# Patient Record
Sex: FEMALE | Race: BLACK OR AFRICAN AMERICAN | Employment: UNEMPLOYED | URBAN - NONMETROPOLITAN AREA
[De-identification: names, ages, dates, MRNs, and addresses within clinical notes are randomized per-mention and may not be internally consistent; named-entity substitution may affect disease eponyms.]

---
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
## 2024-01-05 ENCOUNTER — APPOINTMENT (OUTPATIENT)
Dept: RADIOLOGY | Facility: HOSPITAL | Age: 19
End: 2024-01-05
Payer: COMMERCIAL

## 2024-01-05 ENCOUNTER — HOSPITAL ENCOUNTER (EMERGENCY)
Facility: HOSPITAL | Age: 19
Discharge: HOME | End: 2024-01-06
Attending: STUDENT IN AN ORGANIZED HEALTH CARE EDUCATION/TRAINING PROGRAM
Payer: COMMERCIAL

## 2024-01-05 DIAGNOSIS — D72.829 LEUKOCYTOSIS, UNSPECIFIED TYPE: ICD-10-CM

## 2024-01-05 DIAGNOSIS — R10.9 ABDOMINAL PAIN, UNSPECIFIED ABDOMINAL LOCATION: Primary | ICD-10-CM

## 2024-01-05 LAB
ALBUMIN SERPL BCP-MCNC: 4.3 G/DL (ref 3.4–5)
ALP SERPL-CCNC: 63 U/L (ref 33–110)
ALT SERPL W P-5'-P-CCNC: 11 U/L (ref 7–45)
ANION GAP SERPL CALC-SCNC: 13 MMOL/L (ref 10–20)
APPEARANCE UR: ABNORMAL
AST SERPL W P-5'-P-CCNC: 19 U/L (ref 9–39)
B-HCG SERPL-ACNC: <2 MIU/ML
BASOPHILS # BLD AUTO: 0.02 X10*3/UL (ref 0–0.1)
BASOPHILS NFR BLD AUTO: 0.2 %
BILIRUB SERPL-MCNC: 0.6 MG/DL (ref 0–1.2)
BILIRUB UR STRIP.AUTO-MCNC: NEGATIVE MG/DL
BUN SERPL-MCNC: 12 MG/DL (ref 6–23)
CALCIUM SERPL-MCNC: 9.6 MG/DL (ref 8.6–10.3)
CHLORIDE SERPL-SCNC: 105 MMOL/L (ref 98–107)
CO2 SERPL-SCNC: 23 MMOL/L (ref 21–32)
COLOR UR: YELLOW
CREAT SERPL-MCNC: 0.61 MG/DL (ref 0.5–1.05)
EOSINOPHIL # BLD AUTO: 0.07 X10*3/UL (ref 0–0.7)
EOSINOPHIL NFR BLD AUTO: 0.6 %
ERYTHROCYTE [DISTWIDTH] IN BLOOD BY AUTOMATED COUNT: 15.5 % (ref 11.5–14.5)
FLUAV RNA RESP QL NAA+PROBE: NOT DETECTED
FLUBV RNA RESP QL NAA+PROBE: NOT DETECTED
GFR SERPL CREATININE-BSD FRML MDRD: >90 ML/MIN/1.73M*2
GLUCOSE SERPL-MCNC: 77 MG/DL (ref 74–99)
GLUCOSE UR STRIP.AUTO-MCNC: NEGATIVE MG/DL
HCT VFR BLD AUTO: 39.8 % (ref 36–46)
HGB BLD-MCNC: 12.7 G/DL (ref 12–16)
IMM GRANULOCYTES # BLD AUTO: 0.04 X10*3/UL (ref 0–0.7)
IMM GRANULOCYTES NFR BLD AUTO: 0.4 % (ref 0–0.9)
KETONES UR STRIP.AUTO-MCNC: ABNORMAL MG/DL
LEUKOCYTE ESTERASE UR QL STRIP.AUTO: NEGATIVE
LIPASE SERPL-CCNC: 12 U/L (ref 9–82)
LYMPHOCYTES # BLD AUTO: 2.8 X10*3/UL (ref 1.2–4.8)
LYMPHOCYTES NFR BLD AUTO: 24.7 %
MAGNESIUM SERPL-MCNC: 2.02 MG/DL (ref 1.6–2.4)
MCH RBC QN AUTO: 28.9 PG (ref 26–34)
MCHC RBC AUTO-ENTMCNC: 31.9 G/DL (ref 32–36)
MCV RBC AUTO: 91 FL (ref 80–100)
MONOCYTES # BLD AUTO: 0.76 X10*3/UL (ref 0.1–1)
MONOCYTES NFR BLD AUTO: 6.7 %
NEUTROPHILS # BLD AUTO: 7.66 X10*3/UL (ref 1.2–7.7)
NEUTROPHILS NFR BLD AUTO: 67.4 %
NITRITE UR QL STRIP.AUTO: NEGATIVE
NRBC BLD-RTO: 0 /100 WBCS (ref 0–0)
PH UR STRIP.AUTO: 6 [PH]
PLATELET # BLD AUTO: 255 X10*3/UL (ref 150–450)
POTASSIUM SERPL-SCNC: 5.1 MMOL/L (ref 3.5–5.3)
PROT SERPL-MCNC: 8 G/DL (ref 6.4–8.2)
PROT UR STRIP.AUTO-MCNC: NEGATIVE MG/DL
RBC # BLD AUTO: 4.4 X10*6/UL (ref 4–5.2)
RBC # UR STRIP.AUTO: NEGATIVE /UL
SARS-COV-2 RNA RESP QL NAA+PROBE: NOT DETECTED
SODIUM SERPL-SCNC: 136 MMOL/L (ref 136–145)
SP GR UR STRIP.AUTO: 1.06
UROBILINOGEN UR STRIP.AUTO-MCNC: <2 MG/DL
WBC # BLD AUTO: 11.4 X10*3/UL (ref 4.4–11.3)

## 2024-01-05 PROCEDURE — 83735 ASSAY OF MAGNESIUM: CPT | Performed by: STUDENT IN AN ORGANIZED HEALTH CARE EDUCATION/TRAINING PROGRAM

## 2024-01-05 PROCEDURE — 74177 CT ABD & PELVIS W/CONTRAST: CPT

## 2024-01-05 PROCEDURE — 96375 TX/PRO/DX INJ NEW DRUG ADDON: CPT

## 2024-01-05 PROCEDURE — 81003 URINALYSIS AUTO W/O SCOPE: CPT | Performed by: STUDENT IN AN ORGANIZED HEALTH CARE EDUCATION/TRAINING PROGRAM

## 2024-01-05 PROCEDURE — 74177 CT ABD & PELVIS W/CONTRAST: CPT | Performed by: RADIOLOGY

## 2024-01-05 PROCEDURE — 84702 CHORIONIC GONADOTROPIN TEST: CPT | Performed by: STUDENT IN AN ORGANIZED HEALTH CARE EDUCATION/TRAINING PROGRAM

## 2024-01-05 PROCEDURE — 96374 THER/PROPH/DIAG INJ IV PUSH: CPT

## 2024-01-05 PROCEDURE — 85025 COMPLETE CBC W/AUTO DIFF WBC: CPT | Performed by: STUDENT IN AN ORGANIZED HEALTH CARE EDUCATION/TRAINING PROGRAM

## 2024-01-05 PROCEDURE — 36415 COLL VENOUS BLD VENIPUNCTURE: CPT | Performed by: STUDENT IN AN ORGANIZED HEALTH CARE EDUCATION/TRAINING PROGRAM

## 2024-01-05 PROCEDURE — 2550000001 HC RX 255 CONTRASTS: Performed by: STUDENT IN AN ORGANIZED HEALTH CARE EDUCATION/TRAINING PROGRAM

## 2024-01-05 PROCEDURE — 84075 ASSAY ALKALINE PHOSPHATASE: CPT | Performed by: STUDENT IN AN ORGANIZED HEALTH CARE EDUCATION/TRAINING PROGRAM

## 2024-01-05 PROCEDURE — 2500000004 HC RX 250 GENERAL PHARMACY W/ HCPCS (ALT 636 FOR OP/ED): Performed by: STUDENT IN AN ORGANIZED HEALTH CARE EDUCATION/TRAINING PROGRAM

## 2024-01-05 PROCEDURE — 83690 ASSAY OF LIPASE: CPT | Performed by: STUDENT IN AN ORGANIZED HEALTH CARE EDUCATION/TRAINING PROGRAM

## 2024-01-05 PROCEDURE — 99285 EMERGENCY DEPT VISIT HI MDM: CPT | Performed by: STUDENT IN AN ORGANIZED HEALTH CARE EDUCATION/TRAINING PROGRAM

## 2024-01-05 PROCEDURE — 87636 SARSCOV2 & INF A&B AMP PRB: CPT | Performed by: STUDENT IN AN ORGANIZED HEALTH CARE EDUCATION/TRAINING PROGRAM

## 2024-01-05 PROCEDURE — 99284 EMERGENCY DEPT VISIT MOD MDM: CPT | Performed by: STUDENT IN AN ORGANIZED HEALTH CARE EDUCATION/TRAINING PROGRAM

## 2024-01-05 RX ORDER — ONDANSETRON HYDROCHLORIDE 2 MG/ML
4 INJECTION, SOLUTION INTRAVENOUS ONCE
Status: COMPLETED | OUTPATIENT
Start: 2024-01-05 | End: 2024-01-05

## 2024-01-05 RX ORDER — MORPHINE SULFATE 2 MG/ML
2 INJECTION, SOLUTION INTRAMUSCULAR; INTRAVENOUS ONCE
Status: COMPLETED | OUTPATIENT
Start: 2024-01-05 | End: 2024-01-05

## 2024-01-05 RX ADMIN — IOHEXOL 75 ML: 350 INJECTION, SOLUTION INTRAVENOUS at 23:20

## 2024-01-05 RX ADMIN — SODIUM CHLORIDE 1000 ML: 9 INJECTION, SOLUTION INTRAVENOUS at 23:55

## 2024-01-05 RX ADMIN — ONDANSETRON 4 MG: 2 INJECTION INTRAMUSCULAR; INTRAVENOUS at 22:47

## 2024-01-05 RX ADMIN — MORPHINE SULFATE 2 MG: 2 INJECTION, SOLUTION INTRAMUSCULAR; INTRAVENOUS at 22:46

## 2024-01-05 ASSESSMENT — COLUMBIA-SUICIDE SEVERITY RATING SCALE - C-SSRS
6. HAVE YOU EVER DONE ANYTHING, STARTED TO DO ANYTHING, OR PREPARED TO DO ANYTHING TO END YOUR LIFE?: NO
2. HAVE YOU ACTUALLY HAD ANY THOUGHTS OF KILLING YOURSELF?: NO
1. IN THE PAST MONTH, HAVE YOU WISHED YOU WERE DEAD OR WISHED YOU COULD GO TO SLEEP AND NOT WAKE UP?: NO

## 2024-01-05 ASSESSMENT — LIFESTYLE VARIABLES
EVER FELT BAD OR GUILTY ABOUT YOUR DRINKING: NO
HAVE PEOPLE ANNOYED YOU BY CRITICIZING YOUR DRINKING: NO
EVER HAD A DRINK FIRST THING IN THE MORNING TO STEADY YOUR NERVES TO GET RID OF A HANGOVER: NO
HAVE YOU EVER FELT YOU SHOULD CUT DOWN ON YOUR DRINKING: NO
REASON UNABLE TO ASSESS: NO

## 2024-01-05 ASSESSMENT — PAIN SCALES - GENERAL: PAINLEVEL_OUTOF10: 8

## 2024-01-05 ASSESSMENT — PAIN - FUNCTIONAL ASSESSMENT: PAIN_FUNCTIONAL_ASSESSMENT: 0-10

## 2024-01-05 ASSESSMENT — PAIN DESCRIPTION - LOCATION: LOCATION: ABDOMEN

## 2024-01-06 VITALS
DIASTOLIC BLOOD PRESSURE: 53 MMHG | HEIGHT: 62 IN | RESPIRATION RATE: 18 BRPM | TEMPERATURE: 97.2 F | OXYGEN SATURATION: 97 % | BODY MASS INDEX: 21.53 KG/M2 | WEIGHT: 117 LBS | HEART RATE: 64 BPM | SYSTOLIC BLOOD PRESSURE: 103 MMHG

## 2024-01-06 RX ORDER — ONDANSETRON 4 MG/1
4 TABLET, ORALLY DISINTEGRATING ORAL EVERY 8 HOURS PRN
Qty: 10 TABLET | Refills: 0 | Status: SHIPPED | OUTPATIENT
Start: 2024-01-06 | End: 2024-01-09

## 2024-01-06 NOTE — DISCHARGE INSTRUCTIONS
Please follow up with your primary care physician within 1-2 days.  If you have worsening abdominal pain, fevers, pelvic pain, chest pain, vomiting, or other concerning symptoms, please seek immediate medical attention and come back into the ER.    If you have a return or worsening of symptoms, please seek immediate medical attention.

## 2024-01-06 NOTE — ED PROVIDER NOTES
EMERGENCY DEPARTMENT ENCOUNTER      Pt Name: Derick Finnegan  MRN: [id number]  Birthdate 2005  Date of evaluation: 1/5/2024  Provider: Sonya Gay DO    CHIEF COMPLAINT       Chief Complaint   Patient presents with    Abdominal Pain     Since 3 am, vomiting         HISTORY OF PRESENT ILLNESS    18-year-old female presents with complaint of lower abdominal pain.  Patient states that she began to have the pain at 0300 hrs. this morning.  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  Denies any fevers, chills, chest pain, shortness of breath.  States that she vomited earlier.  Is not feeling nauseous now.  Admits to night sweats.  Denies any diarrhea.  Had a bowel movement earlier today that was regular per the patient.  Denies any black or bloody stools, urinary symptoms, vision changes.  States that she gets occasional headaches.  Came to the ER with her mother for further evaluation.    Past medical history: Denies  Past surgical history: Removal of abscesses from the neck  Allergies: No known drug allergies  Social history: Current smoker.  Admits to alcohol use.  Admits to marijuana use.  Family history: Reviewed noncontributory to today's complaint.  No history of colon cancer, Crohn's disease, or ulcerative colitis in the family.      History provided by:  Patient and parent      Nursing Notes were reviewed.    PAST MEDICAL HISTORY   History reviewed. No pertinent past medical history.      SURGICAL HISTORY       Past Surgical History:   Procedure Laterality Date    OTHER SURGICAL HISTORY  01/04/2023    Throat surgery         CURRENT MEDICATIONS       Discharge Medication List as of 1/6/2024 12:37 AM        CONTINUE these medications which have NOT CHANGED    Details   levocetirizine dihydrochloride (XYZAL ORAL) Xyzal TABS   Refills: 0       Active, Historical Med      lidocaine HCL (Aspercreme, lidocaine HCL,) 4 % cream APPLY 2 INCHES 3 times daily, Historical Med      ZINC ORAL Historical Med              ALLERGIES     Patient has no known allergies.    FAMILY HISTORY     No family history on file.       SOCIAL HISTORY       Social History     Socioeconomic History    Marital status: Single     Spouse name: None    Number of children: None    Years of education: None    Highest education level: None   Occupational History    None   Tobacco Use    Smoking status: None    Smokeless tobacco: None   Substance and Sexual Activity    Alcohol use: None    Drug use: None    Sexual activity: None   Other Topics Concern    None   Social History Narrative    None     Social Determinants of Health     Financial Resource Strain: Not on file   Food Insecurity: Not on file   Transportation Needs: Not on file   Physical Activity: Not on file   Stress: Not on file   Social Connections: Not on file   Intimate Partner Violence: Not on file   Housing Stability: Not on file       SCREENINGS                        PHYSICAL EXAM    (up to 7 for level 4, 8 or more for level 5)     ED Triage Vitals [01/05/24 2102]   Temp Heart Rate Resp BP   36.2 °C (97.2 °F) 77 18 121/67      SpO2 Temp Source Heart Rate Source Patient Position   96 % Temporal -- --      BP Location FiO2 (%)     -- --       Physical Exam  Vitals and nursing note reviewed.   Constitutional:       General: She is not in acute distress.     Appearance: Normal appearance. She is well-developed. She is not ill-appearing or toxic-appearing.   HENT:      Head: Normocephalic and atraumatic.      Nose: Nose normal.      Mouth/Throat:      Mouth: Mucous membranes are moist.      Pharynx: Oropharynx is clear.   Eyes:      Extraocular Movements: Extraocular movements intact.      Conjunctiva/sclera: Conjunctivae normal.   Cardiovascular:      Rate and Rhythm: Normal rate and regular rhythm.      Pulses: Normal pulses.      Heart sounds: Normal heart sounds.   Pulmonary:      Effort: Pulmonary effort is normal. No respiratory distress.      Breath sounds: Normal breath sounds.    Abdominal:      General: Bowel sounds are normal. There is no distension.      Palpations: Abdomen is soft.      Tenderness: There is abdominal tenderness. There is no guarding or rebound. Negative signs include Guerrero's sign and McBurney's sign.      Comments: Minimal tenderness to palpation of the umbilical region and lower abdomen.  No peritoneal signs.  No guarding.  No rigidity.   Musculoskeletal:         General: Normal range of motion.   Skin:     General: Skin is warm and dry.      Capillary Refill: Capillary refill takes less than 2 seconds.   Neurological:      General: No focal deficit present.      Mental Status: She is alert. Mental status is at baseline.   Psychiatric:         Mood and Affect: Mood normal.         Behavior: Behavior normal.         Thought Content: Thought content normal.         Judgment: Judgment normal.          DIAGNOSTIC RESULTS     LABS:  Labs Reviewed   CBC WITH AUTO DIFFERENTIAL - Abnormal       Result Value    WBC 11.4 (*)     nRBC 0.0      RBC 4.40      Hemoglobin 12.7      Hematocrit 39.8      MCV 91      MCH 28.9      MCHC 31.9 (*)     RDW 15.5 (*)     Platelets 255      Neutrophils % 67.4      Immature Granulocytes %, Automated 0.4      Lymphocytes % 24.7      Monocytes % 6.7      Eosinophils % 0.6      Basophils % 0.2      Neutrophils Absolute 7.66      Immature Granulocytes Absolute, Automated 0.04      Lymphocytes Absolute 2.80      Monocytes Absolute 0.76      Eosinophils Absolute 0.07      Basophils Absolute 0.02     URINALYSIS WITH REFLEX MICROSCOPIC - Abnormal    Color, Urine Yellow      Appearance, Urine Hazy (*)     Specific Gravity, Urine 1.060 (*)     pH, Urine 6.0      Protein, Urine NEGATIVE      Glucose, Urine NEGATIVE      Blood, Urine NEGATIVE      Ketones, Urine 80 (2+) (*)     Bilirubin, Urine NEGATIVE      Urobilinogen, Urine <2.0      Nitrite, Urine NEGATIVE      Leukocyte Esterase, Urine NEGATIVE     COMPREHENSIVE METABOLIC PANEL - Normal    Glucose  77      Sodium 136      Potassium 5.1      Chloride 105      Bicarbonate 23      Anion Gap 13      Urea Nitrogen 12      Creatinine 0.61      eGFR >90      Calcium 9.6      Albumin 4.3      Alkaline Phosphatase 63      Total Protein 8.0      AST 19      Bilirubin, Total 0.6      ALT 11     MAGNESIUM - Normal    Magnesium 2.02     LIPASE - Normal    Lipase 12      Narrative:     Venipuncture immediately after or during the administration of Metamizole may lead to falsely low results. Testing should be performed immediately prior to Metamizole dosing.   HUMAN CHORIONIC GONADOTROPIN, SERUM QUANTITATIVE - Normal    HCG, Beta-Quantitative <2      Narrative:      Total HCG measurement is performed using the [email or web address] Access   Immunoassay which detects intact HCG and free beta HCG subunit.    This test is not indicated for use as a tumor marker.   HCG testing is performed using a different test methodology at [facility] than other [facility]. Direct result comparison   should only be made within the same method.       SARS-COV-2 AND INFLUENZA A/B PCR - Normal    Flu A Result Not Detected      Flu B Result Not Detected      Coronavirus 2019, PCR Not Detected      Narrative:     This assay has received FDA Emergency Use Authorization (EUA) and  is only authorized for the duration of time that circumstances exist to justify the authorization of the emergency use of in vitro diagnostic tests for the detection of SARS-CoV-2 virus and/or diagnosis of COVID-19 infection under section 564(b)(1) of the Act, 21 U.S.C. 360bbb-3(b)(1). Testing for SARS-CoV-2 is only recommended for patients who meet current clinical and/or epidemiological criteria as defined by federal, state, or local public health directives. This assay is an in vitro diagnostic nucleic acid amplification test for the qualitative detection of SARS-CoV-2, Influenza A, and Influenza B from nasopharyngeal specimens and has been validated for  use at . Negative results do not preclude COVID-19 infections or Influenza A/B infections, and should not be used as the sole basis for diagnosis, treatment, or other management decisions. If Influenza A/B and RSV PCR results are negative, testing for Parainfluenza virus, Adenovirus and Metapneumovirus is routinely performed for [facility] [place] [facility] and [facility] inpatients, and is available on other patients by placing an add-on request.        All other labs were within normal range or not returned as of this dictation.    Imaging  CT abdomen pelvis w IV contrast   Final Result   2.5 cm left ovarian cyst and small amount of free fluid in the pelvis.        Evaluation of the right lower quadrant is limited secondary to   paucity of intraabdominal fat and the appendix is not definitely   visualized. If the patient is experiencing right lower quadrant pain   or there is concern for appendix pathology, short-term progress   imaging may be considered for further evaluation.        MACRO:   None        Signed by: Alfred Desouza 1/5/2024 11:58 PM   Dictation workstation:   [id number]           Procedures  Procedures     EMERGENCY DEPARTMENT COURSE/MDM:     Diagnoses as of 01/06/24 0504   Abdominal pain, unspecified abdominal location   Leukocytosis, unspecified type        Medical Decision Making  18-year-old female presents with complaint of lower abdominal pain and around the umbilical region.  Does not appear to be pelvic pain in nature.    Nontoxic-appearing female, no acute distress.  Given the patient's complaints, will obtain lab work including CBC, CMP, lipase.  Will also obtain a urine sample and a CT of the ab/pelvis.  Patient will be given 2 mg of morphine to help with pain and Zofran.  She states that her last menstrual cycle was December 20, 2023.  She states that her periods are regular.  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    Radiologist  called and stated that patient likely had an ovarian cyst on the left side and a small amount of free fluid in the pelvis likely caused by a ruptured ovarian follicle.  He did note that the appendix was unable to be visualized however there is no secondary symptoms of appendicitis that they could see.  I discussed the case with the patient and updated her about the results.  She notes that she is not having any right lower quadrant abdominal pain.  I repeated an abdominal exam and she has no peritoneal signs, tenderness to palpation of the abdomen including the right lower quadrant.  She appears well.  She was given strict return precautions including being told that if she should have right lower quadrant abdominal pain, fevers, vomiting, or other concerning symptoms, to seek immediate medical attention and come back to the ER.  She expresses understanding and feels comfortable with plan. Has slight leukocytosis of 11.4.  Urine showed some ketones and increased for gravity.  Patient was given fluids however she does not desire to wait for all of the fluids to be run and she feels comfortable going home and doing oral hydration.      Patient hemodynamically stable. Updated about results. All questions and concerns addressed. Patient discharged in stable condition.          Patient and or family in agreement and understanding of treatment plan.  All questions answered.      I reviewed the case with the attending ED physician. The attending ED physician agrees with the plan. Patient and/or patient´s representative was counseled regarding labs, imaging, likely diagnosis, and plan. All questions were answered.    ED Medications administered this visit:    Medications   morphine injection 2 mg (2 mg intravenous Given 1/5/24 2246)   ondansetron (Zofran) injection 4 mg (4 mg intravenous Given 1/5/24 2247)   iohexol (OMNIPaque) 350 mg iodine/mL solution 75 mL (75 mL intravenous Given 1/5/24 2320)   sodium chloride 0.9 %  bolus 1,000 mL (0 mL intravenous Stopped 1/6/24 0025)       New Prescriptions from this visit:    Discharge Medication List as of 1/6/2024 12:37 AM        START taking these medications    Details   ondansetron ODT (Zofran-ODT) 4 mg disintegrating tablet Take 1 tablet (4 mg) by mouth every 8 hours if needed for nausea or vomiting for up to 3 days., Starting Sat 1/6/2024, Until Tue 1/9/2024 at 2359, Normal             Follow-up:  Kelley Lozoya, APRN-CNP  [address] [place] [address] [address]  [place] [id number]  [phone]    Schedule an appointment as soon as possible for a visit           Final Impression:   1. Abdominal pain, unspecified abdominal location    2. Leukocytosis, unspecified type          (Please note that portions of this note were completed with a voice recognition program.  Efforts were made to edit the dictations but occasionally words are mis-transcribed.)     Sonya Gay,   01/06/24 9130     body ache  fever  chills  cough   sore throat  dysphagia  all other ROS negative except as per HPI

## 2024-01-06 NOTE — ED NOTES
Pt presents to the ED with lower abdominal pain. Pt states the pain began around 3am this morning. Pt was able to sleep but when awake pain was still present.      Eileen Escamilla RN  01/05/24 1592

## 2024-02-02 ENCOUNTER — HOSPITAL ENCOUNTER (EMERGENCY)
Facility: HOSPITAL | Age: 19
Discharge: LEFT BEFORE DISCHARGE | End: 2024-02-02
Attending: STUDENT IN AN ORGANIZED HEALTH CARE EDUCATION/TRAINING PROGRAM
Payer: COMMERCIAL

## 2024-02-02 VITALS
SYSTOLIC BLOOD PRESSURE: 122 MMHG | DIASTOLIC BLOOD PRESSURE: 79 MMHG | HEART RATE: 86 BPM | TEMPERATURE: 98.1 F | BODY MASS INDEX: 21.16 KG/M2 | WEIGHT: 115 LBS | OXYGEN SATURATION: 99 % | HEIGHT: 62 IN | RESPIRATION RATE: 16 BRPM

## 2024-02-02 DIAGNOSIS — R10.84 GENERALIZED ABDOMINAL PAIN: Primary | ICD-10-CM

## 2024-02-02 LAB
APPEARANCE UR: ABNORMAL
BILIRUB UR STRIP.AUTO-MCNC: NEGATIVE MG/DL
COLOR UR: ABNORMAL
GLUCOSE UR STRIP.AUTO-MCNC: NEGATIVE MG/DL
HCG UR QL IA.RAPID: NEGATIVE
HOLD SPECIMEN: NORMAL
KETONES UR STRIP.AUTO-MCNC: NEGATIVE MG/DL
LEUKOCYTE ESTERASE UR QL STRIP.AUTO: NEGATIVE
NITRITE UR QL STRIP.AUTO: NEGATIVE
PH UR STRIP.AUTO: 5 [PH]
PROT UR STRIP.AUTO-MCNC: ABNORMAL MG/DL
RBC # UR STRIP.AUTO: NEGATIVE /UL
RBC #/AREA URNS AUTO: ABNORMAL /HPF
SP GR UR STRIP.AUTO: 1.03
SQUAMOUS #/AREA URNS AUTO: ABNORMAL /HPF
UROBILINOGEN UR STRIP.AUTO-MCNC: 2 MG/DL
WBC #/AREA URNS AUTO: ABNORMAL /HPF

## 2024-02-02 PROCEDURE — 99284 EMERGENCY DEPT VISIT MOD MDM: CPT | Performed by: STUDENT IN AN ORGANIZED HEALTH CARE EDUCATION/TRAINING PROGRAM

## 2024-02-02 PROCEDURE — 81025 URINE PREGNANCY TEST: CPT | Performed by: STUDENT IN AN ORGANIZED HEALTH CARE EDUCATION/TRAINING PROGRAM

## 2024-02-02 PROCEDURE — 81001 URINALYSIS AUTO W/SCOPE: CPT | Performed by: STUDENT IN AN ORGANIZED HEALTH CARE EDUCATION/TRAINING PROGRAM

## 2024-02-02 PROCEDURE — 99283 EMERGENCY DEPT VISIT LOW MDM: CPT

## 2024-02-02 PROCEDURE — 2500000001 HC RX 250 WO HCPCS SELF ADMINISTERED DRUGS (ALT 637 FOR MEDICARE OP)

## 2024-02-02 RX ORDER — KETOROLAC TROMETHAMINE 15 MG/ML
15 INJECTION, SOLUTION INTRAMUSCULAR; INTRAVENOUS ONCE
Status: DISCONTINUED | OUTPATIENT
Start: 2024-02-02 | End: 2024-02-02

## 2024-02-02 RX ORDER — IBUPROFEN 600 MG/1
600 TABLET ORAL ONCE
Status: COMPLETED | OUTPATIENT
Start: 2024-02-02 | End: 2024-02-02

## 2024-02-02 RX ADMIN — IBUPROFEN 600 MG: 600 TABLET, FILM COATED ORAL at 13:51

## 2024-02-02 ASSESSMENT — LIFESTYLE VARIABLES
HAVE PEOPLE ANNOYED YOU BY CRITICIZING YOUR DRINKING: NO
EVER FELT BAD OR GUILTY ABOUT YOUR DRINKING: NO
EVER HAD A DRINK FIRST THING IN THE MORNING TO STEADY YOUR NERVES TO GET RID OF A HANGOVER: NO
HAVE YOU EVER FELT YOU SHOULD CUT DOWN ON YOUR DRINKING: NO

## 2024-02-02 ASSESSMENT — COLUMBIA-SUICIDE SEVERITY RATING SCALE - C-SSRS
6. HAVE YOU EVER DONE ANYTHING, STARTED TO DO ANYTHING, OR PREPARED TO DO ANYTHING TO END YOUR LIFE?: NO
1. IN THE PAST MONTH, HAVE YOU WISHED YOU WERE DEAD OR WISHED YOU COULD GO TO SLEEP AND NOT WAKE UP?: NO
2. HAVE YOU ACTUALLY HAD ANY THOUGHTS OF KILLING YOURSELF?: NO

## 2024-02-02 ASSESSMENT — PAIN - FUNCTIONAL ASSESSMENT: PAIN_FUNCTIONAL_ASSESSMENT: 0-10

## 2024-02-02 ASSESSMENT — PAIN SCALES - GENERAL
PAINLEVEL_OUTOF10: 6
PAINLEVEL_OUTOF10: 7

## 2024-02-02 ASSESSMENT — PAIN DESCRIPTION - ORIENTATION: ORIENTATION: LEFT

## 2024-02-02 ASSESSMENT — PAIN DESCRIPTION - PAIN TYPE: TYPE: ACUTE PAIN

## 2024-02-02 NOTE — ED PROVIDER NOTES
EMERGENCY DEPARTMENT ENCOUNTER      Pt Name: Derick Finnegan  MRN: [id number]  Birthdate 2005  Date of evaluation: 2/2/2024  Provider: Skinny Junior MD    CHIEF COMPLAINT       Chief Complaint   Patient presents with    Abdominal Pain     Left side. Ovarian cyst         HISTORY OF PRESENT ILLNESS    HPI  18-year-old female presents emergency room with chief complaint of left-sided ovarian pain 6 out of 10.  Patient indicates that she developed this pain and has a past medical history of an ovarian cyst on the left side.  She claims that the pain probably came on today's been on and off throughout the day.  Nursing Notes were reviewed.    PAST MEDICAL HISTORY   History reviewed. No pertinent past medical history.      SURGICAL HISTORY       Past Surgical History:   Procedure Laterality Date    OTHER SURGICAL HISTORY  01/04/2023    Throat surgery         CURRENT MEDICATIONS       Previous Medications    LEVOCETIRIZINE DIHYDROCHLORIDE (XYZAL ORAL)    Xyzal TABS   Refills: 0       Active    LIDOCAINE HCL (ASPERCREME, LIDOCAINE HCL,) 4 % CREAM    APPLY 2 INCHES 3 times daily    ZINC ORAL           ALLERGIES     Patient has no known allergies.    FAMILY HISTORY     No family history on file.       SOCIAL HISTORY       Social History     Socioeconomic History    Marital status: Single     Spouse name: None    Number of children: None    Years of education: None    Highest education level: None   Occupational History    None   Tobacco Use    Smoking status: Never    Smokeless tobacco: Never   Substance and Sexual Activity    Alcohol use: Not Currently    Drug use: Not Currently    Sexual activity: None   Other Topics Concern    None   Social History Narrative    None     Social Determinants of Health     Financial Resource Strain: Not on file   Food Insecurity: Not on file   Transportation Needs: Not on file   Physical Activity: Not on file   Stress: Not on file   Social Connections: Not on file   Intimate Partner Violence:  Not on file   Housing Stability: Not on file       SCREENINGS                        PHYSICAL EXAM    (up to 7 for level 4, 8 or more for level 5)     ED Triage Vitals [02/02/24 1103]   Temperature Heart Rate Respirations BP   36.7 °C (98.1 °F) 91 20 117/69      Pulse Ox Temp Source Heart Rate Source Patient Position   99 % Temporal Monitor --      BP Location FiO2 (%)     -- --       Physical Exam  Vitals and nursing note reviewed.   Constitutional:       General: She is not in acute distress.     Appearance: She is well-developed.   HENT:      Head: Normocephalic and atraumatic.   Eyes:      Conjunctiva/sclera: Conjunctivae normal.   Cardiovascular:      Rate and Rhythm: Normal rate and regular rhythm.      Heart sounds: No murmur heard.  Pulmonary:      Effort: Pulmonary effort is normal. No respiratory distress.      Breath sounds: Normal breath sounds.   Abdominal:      General: Bowel sounds are normal.      Palpations: Abdomen is soft.      Tenderness: There is abdominal tenderness in the epigastric area, left upper quadrant and left lower quadrant.      Comments: Patient has pain to deep palpation left upper and left lower quadrant.   Musculoskeletal:         General: No swelling.      Cervical back: Neck supple.   Skin:     General: Skin is warm and dry.      Capillary Refill: Capillary refill takes less than 2 seconds.   Neurological:      Mental Status: She is alert.   Psychiatric:         Mood and Affect: Mood normal.          DIAGNOSTIC RESULTS     LABS:  Labs Reviewed   URINALYSIS WITH REFLEX CULTURE AND MICROSCOPIC - Abnormal       Result Value    Color, Urine Amanda (*)     Appearance, Urine Hazy (*)     Specific Gravity, Urine 1.031      pH, Urine 5.0      Protein, Urine 100 (2+) (*)     Glucose, Urine NEGATIVE      Blood, Urine NEGATIVE      Ketones, Urine NEGATIVE      Bilirubin, Urine NEGATIVE      Urobilinogen, Urine 2.0 (*)     Nitrite, Urine NEGATIVE      Leukocyte Esterase, Urine NEGATIVE      URINALYSIS MICROSCOPIC WITH REFLEX CULTURE - Abnormal    WBC, Urine 1-5      RBC, Urine 6-10 (*)     Squamous Epithelial Cells, Urine 1-9 (SPARSE)     HCG, URINE, QUALITATIVE - Normal    HCG, Urine NEGATIVE     URINALYSIS WITH REFLEX CULTURE AND MICROSCOPIC    Narrative:     The following orders were created for panel order Urinalysis with Reflex Culture and Microscopic.  Procedure                               Abnormality         Status                     ---------                               -----------         ------                     Urinalysis with Reflex C...[[id number]]  Abnormal            Final result               Extra Urine Gray Tube[[id number]]                            In process                   Please view results for these tests on the individual orders.   EXTRA URINE GRAY TUBE       All other labs were within normal range or not returned as of this dictation.    Imaging  US pelvis    (Results Pending)        Procedures  Procedures     EMERGENCY DEPARTMENT COURSE/MDM:     Diagnoses as of 02/10/24 1656   Generalized abdominal pain        Medical Decision Making  18-year-old female presents emergency room with chief complaint of abdominal pain.  Medical management treatment emergency room will consist of pain management including Toradol as well as a pelvic ultrasound.  To rule out possible ruptured cyst.  Urinalysis revealed the patient has blood in her urine.  The patient will be discharged home.  We have provided her with Toradol and she feels well with going home.  We have also informed her she needs to follow-up with her OB/GYN.        Patient and or family in agreement and understanding of treatment plan.  All questions answered.      I reviewed the case with the attending ED physician. The attending ED physician agrees with the plan. Patient and/or patient´s representative was counseled regarding labs, imaging, likely diagnosis, and plan. All questions were answered.    ED Medications  administered this visit:    Medications   ketorolac (Toradol) injection 15 mg (has no administration in time range)       New Prescriptions from this visit:    New Prescriptions    No medications on file       Follow-up:  No follow-up provider specified.      Final Impression: No diagnosis found.      (Please note that portions of this note were completed with a voice recognition program.  Efforts were made to edit the dictations but occasionally words are mis-transcribed.)     Skinny Junior MD  Resident  02/10/24 [id number]

## 2024-02-06 ENCOUNTER — OFFICE VISIT (OUTPATIENT)
Dept: OBSTETRICS AND GYNECOLOGY | Facility: CLINIC | Age: 19
End: 2024-02-06
Payer: COMMERCIAL

## 2024-02-06 VITALS
HEIGHT: 62 IN | DIASTOLIC BLOOD PRESSURE: 58 MMHG | SYSTOLIC BLOOD PRESSURE: 98 MMHG | WEIGHT: 116.13 LBS | BODY MASS INDEX: 21.37 KG/M2

## 2024-02-06 DIAGNOSIS — N83.299 PHYSIOLOGICAL OVARIAN CYSTS: ICD-10-CM

## 2024-02-06 DIAGNOSIS — R10.2 PELVIC PAIN: Primary | ICD-10-CM

## 2024-02-06 DIAGNOSIS — Z30.011 ENCOUNTER FOR INITIAL PRESCRIPTION OF CONTRACEPTIVE PILLS: ICD-10-CM

## 2024-02-06 PROCEDURE — 1036F TOBACCO NON-USER: CPT

## 2024-02-06 PROCEDURE — 99204 OFFICE O/P NEW MOD 45 MIN: CPT

## 2024-02-06 RX ORDER — NORETHINDRONE ACETATE AND ETHINYL ESTRADIOL AND FERROUS FUMARATE 1MG-20(24)
1 KIT ORAL DAILY
Qty: 84 TABLET | Refills: 1 | Status: SHIPPED | OUTPATIENT
Start: 2024-02-06 | End: 2024-07-23

## 2024-02-06 NOTE — PROGRESS NOTES
Patient presents today for Ovarian Cysts.  Patient in ER Jan & Feb 2024 for abdominal pain.  CT performed and found cysts and one was ruptured.  Patient states pain comes and goes.  Patient was given anti-nausea medication in ER.  LMP 1/16/24    CHARLIE HUDSON MA    Patient here today for concerns of ovarian cysts. Patient reports that  January 5th  she had sever pain to the point that she vomiting and she went into the ER. At that time she had a CT scan performed that showed free fluid and a 2.5 cm left ovarian cyst. Patient was discharged home with anti- nausea meds. Patient returned to ER on 2/2 for concerns of pelvic pain again and was given toradol for pain and then left without having US completed due to long wait. Patient reports that pain had continued, lingering around a 3 and that increases to an 8 at times. Patient denies dyspareunia, though is not currently sexually active. Patient denies any aggravating factors and reports that motrin will help with the discomfort.       Physical Exam  Constitutional:       Appearance: Normal appearance.   Eyes:      Conjunctiva/sclera: Conjunctivae normal.   Pulmonary:      Effort: Pulmonary effort is normal.   Abdominal:      General: Abdomen is flat.      Palpations: Abdomen is soft.      Tenderness: There is abdominal tenderness in the left lower quadrant.      Comments: Mild tenderness of left lower quadrant with deep palpation   Neurological:      Mental Status: She is alert.   Psychiatric:         Mood and Affect: Mood normal.            Plan: Pelvic US ordered. Discussed use of OCPs as a management option for ovarian cysts.  Patient agreeable.  Discussed oral contraceptive use including the lack of protection from STDs, risks, benefits and alternatives. The risks of clotting with birth control containing estrogen was discussed with the patient. She denies a personal history of  migraine with aura, blood clotting and hypertension. She denies having any  relatives with a history of breast cancer, DVT or PE, and any relatives less than 50 years old with a history of MI or CVA.   Patient aware and consents to starting this method and rx sent to pharmacy. Instructions and warning s/s provided. Will start the Sunday after her next period, and use a back up method of contraception for one week. Patient encouraged to read information packet and be compliant with daily use.    Patient to have pelvic US completed at her convenience. Patient to follow-up in office in 3 months to assess effectiveness of plan.

## 2024-04-02 ENCOUNTER — OFFICE VISIT (OUTPATIENT)
Dept: DERMATOLOGY | Facility: CLINIC | Age: 19
End: 2024-04-02
Payer: COMMERCIAL

## 2024-04-02 DIAGNOSIS — L73.2 HIDRADENITIS SUPPURATIVA: ICD-10-CM

## 2024-04-02 DIAGNOSIS — L70.0 ACNE VULGARIS: Primary | ICD-10-CM

## 2024-04-02 PROCEDURE — 99203 OFFICE O/P NEW LOW 30 MIN: CPT | Performed by: NURSE PRACTITIONER

## 2024-04-02 RX ORDER — SPIRONOLACTONE 50 MG/1
50 TABLET, FILM COATED ORAL DAILY
Qty: 90 TABLET | Refills: 1 | Status: SHIPPED | OUTPATIENT
Start: 2024-04-02 | End: 2025-04-02

## 2024-04-02 RX ORDER — TRETINOIN 0.25 MG/G
CREAM TOPICAL NIGHTLY
Qty: 20 G | Refills: 1 | Status: SHIPPED | OUTPATIENT
Start: 2024-04-02 | End: 2025-04-02

## 2024-04-02 RX ORDER — BENZOYL PEROXIDE 100 MG/ML
1 LIQUID TOPICAL DAILY
Qty: 237 G | Refills: 11 | Status: SHIPPED | OUTPATIENT
Start: 2024-04-02 | End: 2025-04-02

## 2024-04-02 NOTE — PROGRESS NOTES
Subjective     Derick Finnegan is a 18 y.o. female who presents for the following: Acne and Hidradenitis Suppurativa.     New patient to establish care currently using Panoyxl wash, aveeno lotion for acne to face and chest, has tried Dapsone and Bio Oil.   HS to axilla, genitalia and buttock has tried BPO wash, clindamycin lotion and zinc.       Review of Systems:  No other skin or systemic complaints other than what is documented elsewhere in the note.    The following portions of the chart were reviewed this encounter and updated as appropriate:       Skin Cancer History  No skin cancer on file.    Specialty Problems    None    Past Medical History:  Derick Finnegan  has no past medical history on file.    Past Surgical History:  Derick Finnegan  has a past surgical history that includes Other surgical history (01/04/2023).    Family History:  Patient family history is not on file.    Social History:  Derick Finnegan  reports that she has never smoked. She has never used smokeless tobacco. She reports current alcohol use. She reports current drug use. Drug: Marijuana.    Allergies:  Patient has no known allergies.    Current Medications / CAM's:    Current Outpatient Medications:     benzoyl peroxide (Benzac AC) 10 % external wash, Apply 1 Application topically once daily., Disp: 237 g, Rfl: 11    levocetirizine dihydrochloride (XYZAL ORAL), Xyzal TABS  Refills: 0     Active, Disp: , Rfl:     lidocaine HCL (Aspercreme, lidocaine HCL,) 4 % cream, APPLY 2 INCHES 3 times daily, Disp: , Rfl:     norethindrone-e.estradioL-iron (Junel Fe 24) 1 mg-20 mcg (24)/75 mg (4) tablet, Take 1 tablet by mouth once daily., Disp: 84 tablet, Rfl: 1    spironolactone (Aldactone) 50 mg tablet, Take 1 tablet (50 mg) by mouth once daily., Disp: 90 tablet, Rfl: 1    tretinoin (Retin-A) 0.025 % cream, Apply topically once daily at bedtime., Disp: 20 g, Rfl: 1    ZINC ORAL, , Disp: , Rfl:      Objective   Well appearing patient in no apparent distress;  mood and affect are within normal limits.    A full examination was performed including scalp, head, eyes, ears, nose, lips, neck, chest, axillae, abdomen, back, buttocks, bilateral upper extremities, bilateral lower extremities, hands, feet, fingers, toes, fingernails, and toenails. All findings within normal limits unless otherwise noted below.    Assessment/Plan   1. Acne vulgaris  Head - Anterior (Face)  Primarily PIH on exam today.  Primarily comedones to nose only slightly improved with BPO wash.    Maintenance of Current Regimen: Please continue using the prescribed topical medications as directed. Consistency is key to maintaining the improvement achieved so far.    PLAN:  Continue BPO wash  Start tretinoin 0.025% nightly      Further Treatment Options: If there are no significant improvements or if your acne worsens, we may consider additional treatment options such as oral medications or in-office procedures. However, it is important to note that most cases of mild acne can be managed effectively with the current regimen.    Skin Care Tips: Continue following a gentle cleansing routine, avoiding harsh scrubbing, and using non-comedogenic moisturizers to keep your skin hydrated without clogging pores.    Cleansing Routine: Gentle Cleanser: You have been using a mild, non-comedogenic cleanser to wash your face twice daily. This practice helps to remove excess oil, dirt, and impurities from your skin, minimizing the risk of pore blockage.    Lifestyle Measures: Avoiding Trigger Factors: We also discussed avoiding known triggers such as excessive sun exposure, touching or picking at the acne lesions, and using heavy or comedogenic cosmetic products.    Please feel free to contact our clinic if you have any questions or concerns between appointments. Remember, acne treatment requires patience and consistency.                 benzoyl peroxide (Benzac AC) 10 % external wash - Head - Anterior (Face)  Apply 1  "Application topically once daily.    tretinoin (Retin-A) 0.025 % cream - Head - Anterior (Face)  Apply topically once daily at bedtime.    2. Hidradenitis suppurativa  Left Axilla, Pubic, Right Axilla   Recurring tender, erythematous nodules, pustules, and abscesses that occur weeks or months apart. Disease progression with formation of tunnels, scars, and chronic purulent drainage can occur.    Mild on exam, but evidence of previous scarring present.  Patient gets new lesions monthly at menstruation        Hidradenitis suppurativa (HS), or acne inversa, is a chronic destructive inflammatory disorder of the hair follicles. While the pathogenesis is unclear, it is thought that follicular rupture initiates interaction between the follicular microbiome and innate immune system, triggering an inflammatory response.    HS is seen most commonly on the buttocks, breasts, groin, and axillae. Usually, the onset of the disease occurs soon after puberty, and patients typically report recurring \"boils.\" Pain is consistently reported as the most bothersome symptom, but itching, drainage, odor, fatigue, and arthralgias are frequent additional concerns.    Mechanical irritation such as by friction from tight clothing or shaving is often reported as a trigger. For many women, the week before menses can trigger disease flares, and pregnancy and the postpartum period can be associated with either disease improvement or flaring.    Obesity and cigarette smoking are associated with HS severity. Prevalence of metabolic syndrome, major cardiovascular events, cardiac death, and diabetes (type 1 or type 2) are increased in HS compared to the general populations. Some of the most frequently associated comorbidities are related to mental health, and depression, anxiety, and risk of suicide are major burdens for the population.     A positive family history is reported in 30%-50% of patients.     PLAN:  Start BPO 10% wash daily  Start " spironolactone 50mg daily     Related Medications  spironolactone (Aldactone) 50 mg tablet  Take 1 tablet (50 mg) by mouth once daily.

## 2024-06-27 ENCOUNTER — LAB REQUISITION (OUTPATIENT)
Dept: LAB | Facility: HOSPITAL | Age: 19
End: 2024-06-27
Payer: COMMERCIAL

## 2024-06-27 DIAGNOSIS — R10.84 GENERALIZED ABDOMINAL PAIN: ICD-10-CM

## 2024-06-27 PROCEDURE — 87086 URINE CULTURE/COLONY COUNT: CPT

## 2024-06-29 LAB — BACTERIA UR CULT: NORMAL

## 2024-09-13 ENCOUNTER — APPOINTMENT (OUTPATIENT)
Dept: RADIOLOGY | Facility: HOSPITAL | Age: 19
End: 2024-09-13
Payer: COMMERCIAL

## 2024-09-13 ENCOUNTER — HOSPITAL ENCOUNTER (EMERGENCY)
Facility: HOSPITAL | Age: 19
Discharge: HOME | End: 2024-09-13
Attending: STUDENT IN AN ORGANIZED HEALTH CARE EDUCATION/TRAINING PROGRAM
Payer: COMMERCIAL

## 2024-09-13 VITALS
RESPIRATION RATE: 16 BRPM | SYSTOLIC BLOOD PRESSURE: 128 MMHG | BODY MASS INDEX: 22.08 KG/M2 | OXYGEN SATURATION: 98 % | DIASTOLIC BLOOD PRESSURE: 78 MMHG | WEIGHT: 120 LBS | HEART RATE: 78 BPM | HEIGHT: 62 IN | TEMPERATURE: 97.3 F

## 2024-09-13 DIAGNOSIS — S69.92XA SCAPHOLUNATE LIGAMENT INJURY WITH NO INSTABILITY, LEFT, INITIAL ENCOUNTER: ICD-10-CM

## 2024-09-13 DIAGNOSIS — M65.4 DE QUERVAIN'S TENOSYNOVITIS: Primary | ICD-10-CM

## 2024-09-13 PROCEDURE — 73110 X-RAY EXAM OF WRIST: CPT | Mod: LEFT SIDE | Performed by: RADIOLOGY

## 2024-09-13 PROCEDURE — 99283 EMERGENCY DEPT VISIT LOW MDM: CPT | Performed by: STUDENT IN AN ORGANIZED HEALTH CARE EDUCATION/TRAINING PROGRAM

## 2024-09-13 PROCEDURE — 73110 X-RAY EXAM OF WRIST: CPT | Mod: LT

## 2024-09-13 PROCEDURE — 99283 EMERGENCY DEPT VISIT LOW MDM: CPT

## 2024-09-13 RX ORDER — ACETAMINOPHEN 325 MG/1
650 TABLET ORAL ONCE
Status: COMPLETED | OUTPATIENT
Start: 2024-09-13 | End: 2024-09-13

## 2024-09-13 ASSESSMENT — LIFESTYLE VARIABLES
HAVE PEOPLE ANNOYED YOU BY CRITICIZING YOUR DRINKING: NO
HAVE YOU EVER FELT YOU SHOULD CUT DOWN ON YOUR DRINKING: NO
EVER FELT BAD OR GUILTY ABOUT YOUR DRINKING: NO

## 2024-09-13 ASSESSMENT — PAIN SCALES - GENERAL
PAINLEVEL_OUTOF10: 8
PAINLEVEL_OUTOF10: 5 - MODERATE PAIN

## 2024-09-13 ASSESSMENT — COLUMBIA-SUICIDE SEVERITY RATING SCALE - C-SSRS
1. IN THE PAST MONTH, HAVE YOU WISHED YOU WERE DEAD OR WISHED YOU COULD GO TO SLEEP AND NOT WAKE UP?: NO
6. HAVE YOU EVER DONE ANYTHING, STARTED TO DO ANYTHING, OR PREPARED TO DO ANYTHING TO END YOUR LIFE?: NO
2. HAVE YOU ACTUALLY HAD ANY THOUGHTS OF KILLING YOURSELF?: NO

## 2024-09-13 ASSESSMENT — PAIN DESCRIPTION - PAIN TYPE: TYPE: ACUTE PAIN

## 2024-09-13 ASSESSMENT — PAIN - FUNCTIONAL ASSESSMENT: PAIN_FUNCTIONAL_ASSESSMENT: 0-10

## 2024-09-13 ASSESSMENT — PAIN DESCRIPTION - ORIENTATION: ORIENTATION: LEFT

## 2024-09-13 ASSESSMENT — PAIN DESCRIPTION - LOCATION: LOCATION: WRIST

## 2024-09-14 NOTE — DISCHARGE INSTRUCTIONS
Please take ibuprofen as needed and follow-up with primary care physician and orthopedics.  Return to emergency department if intractable pain, numbness/tingling sensation, weakness, worsening symptoms or any concerns.

## 2024-09-14 NOTE — ED PROVIDER NOTES
EMERGENCY DEPARTMENT ENCOUNTER      Pt Name: Derick Finnegan  MRN: [id number]  Birthdate 2005  Date of evaluation: 9/13/2024  Provider: Alma Chau MD    CHIEF COMPLAINT       Chief Complaint   Patient presents with    Wrist Pain     Lt wrist pain trouble moving wrist for a few months.  Worse last 4 days.   Denies specific injury.   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         HISTORY OF PRESENT ILLNESS    HPI  A 19-year-old female with no past medical history comes in due to left wrist excruciating and sharp 10+/10 pain. The left wrist pain started couple months ago and is worsening during the last 3 days. Pain worsens with movement or when holding objects.  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 She believes she may have sustained an injury either at work or during her workouts. She did not take any medication at home for symptoms.  Denies trauma, fall, radiation of the pain, weakness, numbness/tingling sensation.    Nursing Notes were reviewed.    PAST MEDICAL HISTORY   History reviewed. No pertinent past medical history.      SURGICAL HISTORY       Past Surgical History:   Procedure Laterality Date    OTHER SURGICAL HISTORY  01/04/2023    Throat surgery         CURRENT MEDICATIONS       Discharge Medication List as of 9/13/2024 10:09 PM        CONTINUE these medications which have NOT CHANGED    Details   benzoyl peroxide (Benzac AC) 10 % external wash Apply 1 Application topically once daily., Starting Tue 4/2/2024, Until Wed 4/2/2025, Normal      levocetirizine dihydrochloride (XYZAL ORAL) Xyzal TABS   Refills: 0       Active, Historical Med      lidocaine HCL (Aspercreme, lidocaine HCL,) 4 % cream APPLY 2 INCHES 3 times daily, Historical Med      norethindrone-e.estradioL-iron (Junel Fe 24) 1 mg-20 mcg (24)/75 mg (4) tablet Take 1 tablet by mouth once daily., Starting Tue 2/6/2024, Until Tue 7/23/2024, Normal      spironolactone (Aldactone) 50 mg tablet Take 1 tablet (50 mg) by mouth  once daily., Starting Tue 4/2/2024, Until Wed 4/2/2025, Normal      tretinoin (Retin-A) 0.025 % cream Apply topically once daily at bedtime., Starting Tue 4/2/2024, Until Wed 4/2/2025, Normal      ZINC ORAL Historical Med             ALLERGIES     Patient has no known allergies.    FAMILY HISTORY     No family history on file.       SOCIAL HISTORY       Social History     Socioeconomic History    Marital status: Single   Tobacco Use    Smoking status: Never    Smokeless tobacco: Never   Vaping Use    Vaping status: Never Used   Substance and Sexual Activity    Alcohol use: Yes     Comment: Socially    Drug use: Yes     Types: Marijuana    Sexual activity: Yes     Partners: Gender-Diverse       SCREENINGS                        PHYSICAL EXAM    (up to 7 for level 4, 8 or more for level 5)     ED Triage Vitals [09/13/24 1957]   Temperature Heart Rate Respirations BP   36.3 °C (97.3 °F) 78 16 139/79      Pulse Ox Temp src Heart Rate Source Patient Position   98 % -- -- --      BP Location FiO2 (%)     -- --       Physical Exam  Constitutional:       General: She is not in acute distress.     Appearance: Normal appearance. She is normal weight. She is not ill-appearing or diaphoretic.   HENT:      Head: Normocephalic and atraumatic.      Nose: Nose normal.      Mouth/Throat:      Mouth: Mucous membranes are moist.   Eyes:      Extraocular Movements: Extraocular movements intact.   Cardiovascular:      Rate and Rhythm: Normal rate and regular rhythm.      Pulses: Normal pulses.      Heart sounds: Normal heart sounds.   Pulmonary:      Effort: Pulmonary effort is normal.      Breath sounds: Normal breath sounds.   Musculoskeletal:         General: Tenderness present. No swelling, deformity or signs of injury. Normal range of motion.      Cervical back: Normal range of motion and neck supple. No rigidity or tenderness.      Comments: Positive De Quervain test on the left hand  Limited flexion and internal rotation of the  left wrist due to pain.   Skin:     General: Skin is warm and dry.      Capillary Refill: Capillary refill takes less than 2 seconds.      Findings: No bruising, erythema, lesion or rash.   Neurological:      General: No focal deficit present.      Mental Status: She is alert and oriented to person, place, and time.          DIAGNOSTIC RESULTS     LABS:  Labs Reviewed - No data to display    All other labs were within normal range or not returned as of this dictation.    Imaging  XR wrist left 3+ views   Final Result   Findings suggest scapholunate ligament injury which can be further   investigated with MRI of the wrist.  No acute displaced fracture..   Signed by Cornelius Woo MD           Procedures  Procedures     EMERGENCY DEPARTMENT COURSE/MDM:     Diagnoses as of 09/13/24 2230   Scapholunate ligament injury with no instability, left, initial encounter        Medical Decision Making   A 19-year-old female with no past medical history comes in due to left wrist excruciating and sharp pain.  Tylenol was ordered.  Left wrist imaging with results of scapholunate ligament injury but no acute fracture.  Will discharge her home recommendations of following up with orthopedics/PCP and ibuprofen as needed.    Patient and or family in agreement and understanding of treatment plan.  All questions answered.      I reviewed the case with the attending ED physician. The attending ED physician agrees with the plan. Patient and/or patient´s representative was counseled regarding labs, imaging, likely diagnosis, and plan. All questions were answered.    ED Medications administered this visit:    Medications   acetaminophen (Tylenol) tablet 650 mg (650 mg oral Given 9/13/24 2042)       New Prescriptions from this visit:    Discharge Medication List as of 9/13/2024 10:09 PM          Follow-up:  Kelley Lozoya, APRN-CNP  [address], [address]  [facility] [address]  [phone]    In 1 day           Final Impression:   1. Scapholunate ligament injury with no instability, left, initial encounter          (Please note that portions of this note were completed with a voice recognition program.  Efforts were made to edit the dictations but occasionally words are mis-transcribed.)     Alma Chau MD  Resident  09/13/24 [id number]

## 2024-09-17 NOTE — PROGRESS NOTES
No chief complaint on file.    History of Present Illness:  Derick Finnegan is a 19 y.o. female presenting to clinic today with complaints of left wrist pain that started a couple of months ago. She was seen in the ED for this on 09/13/24.    Imaging:  X-rays left wrist: No acute fractures dislocations.  No arthritic change.     Assessment:   ***    Plan:  We reviewed the role of imaging, physical therapy, injections and the time frame to healing and correlation with outcome.  ***  NSAID: *** sent to the pharmacy.  GI side effects and medical risks discussed  Ice: 60 minutes on and off  Exercise home program: Medically directed Shoulder therapy / Handout given  ***     Physical Exam:  Well-nourished, well-developed. No acute distress. Alert and oriented x3. Responds appropriately to questioning. Good mood. Normal affect.  Physical Exam  Left Wrist:  Volar flexion, dorsiflexion, pronation/supination without limitation  No tenderness to palpation over distal radius, distal ulna or TFCC  No tenderness to palpation over the scaphoid  Negative piano key sign  Negative Finkelstein test  No pain with CMC grind  Neurovascular exam normal distally       Review of Systems:  GENERAL: Negative for malaise, significant weight loss, fever  MUSCULOSKELETAL: See HPI  NEURO:  Negative     No past medical history on file.    Medication Documentation Review Audit       Reviewed by Ramon Venegas RN (Registered Nurse) on 09/13/24 at 2001      Medication Order Taking? Sig Documenting Provider Last Dose Status   benzoyl peroxide (Benzac AC) 10 % external wash [id number]  Apply 1 Application topically once daily. Prisca Nguyen, APRN-CNP  Active   levocetirizine dihydrochloride (XYZAL ORAL) [id number]  Xyzal TABS   Refills: 0       Active Historical Provider, MD  Active   lidocaine HCL (Aspercreme, lidocaine HCL,) 4 % cream [id number]  APPLY 2 INCHES 3 times daily Historical Provider, MD  Active   norethindrone-e.estradioL-iron (Junel Fe 24) 1  mg-20 mcg (24)/75 mg (4) tablet [id number]  Take 1 tablet by mouth once daily. Charis HUSSEIN LuisitoalJESSA   24 2351   spironolactone (Aldactone) 50 mg tablet [id number]  Take 1 tablet (50 mg) by mouth once daily. IWONA Pringle  Active   tretinoin (Retin-A) 0.025 % cream [id number]  Apply topically once daily at bedtime. IWONA Pringle  Active   ZINC ORAL [id number]   Historical Provider, MD  Active                    No Known Allergies    Social History     Socioeconomic History    Marital status: Single     Spouse name: Not on file    Number of children: Not on file    Years of education: Not on file    Highest education level: Not on file   Occupational History    Not on file   Tobacco Use    Smoking status: Never    Smokeless tobacco: Never   Vaping Use    Vaping status: Never Used   Substance and Sexual Activity    Alcohol use: Yes     Comment: Socially    Drug use: Yes     Types: Marijuana    Sexual activity: Yes     Partners: Gender-Diverse   Other Topics Concern    Not on file   Social History Narrative    Not on file     Social Determinants of Health     Financial Resource Strain: Not on file   Food Insecurity: Not on file   Transportation Needs: Not on file   Physical Activity: Not on file   Stress: Not on file   Social Connections: Not on file   Intimate Partner Violence: Not on file   Housing Stability: Not on file       Past Surgical History:   Procedure Laterality Date    OTHER SURGICAL HISTORY  2023    Throat surgery        XR wrist left 3+ views    Result Date: 2024  STUDY: Wrist Radiographs; 2024 8:52 PM INDICATION: Wrist pain. COMPARISON: None available. ACCESSION NUMBER(S): [id number] ORDERING CLINICIAN: JULI MCOCY TECHNIQUE:  Four views of the left wrist. FINDINGS:  Negative ulnar variance.  No acute displaced fracture.  The scapholunate interval appears borderline widened at 3 to 4 mm.  The scaphoid is rotated in the palmar direction.  Joint spaces  are well-maintained.  Soft tissues show no concerning finding.    Findings suggest scapholunate ligament injury which can be further investigated with MRI of the wrist.  No acute displaced fracture.. Signed by Cornelius Woo MD

## 2024-09-19 ENCOUNTER — APPOINTMENT (OUTPATIENT)
Dept: ORTHOPEDIC SURGERY | Facility: CLINIC | Age: 19
End: 2024-09-19
Payer: COMMERCIAL

## 2024-09-23 ENCOUNTER — APPOINTMENT (OUTPATIENT)
Dept: ORTHOPEDIC SURGERY | Facility: CLINIC | Age: 19
End: 2024-09-23
Payer: COMMERCIAL

## 2024-09-23 DIAGNOSIS — M65.4 DE QUERVAIN'S DISEASE (TENOSYNOVITIS): Primary | ICD-10-CM

## 2024-09-23 PROCEDURE — 1036F TOBACCO NON-USER: CPT | Performed by: ORTHOPAEDIC SURGERY

## 2024-09-23 PROCEDURE — 99203 OFFICE O/P NEW LOW 30 MIN: CPT | Performed by: ORTHOPAEDIC SURGERY

## 2024-09-23 NOTE — PROGRESS NOTES
History present illness: Patient presents today for evaluation of left radial wrist pain.  No discrete injury.  Right-hand-dominant.  Otherwise healthy.      Past medical history: The patient's past medical history, family history, social history, and review of systems were documented on the patient medical intake.  The updated data was reviewed in the electronic medical record.  History is negative except otherwise stated in history of present illness.        Physical examination:  General: Alert and oriented to person, place, and time.  No acute distress and breathing comfortably: Pleasant and cooperative with examination.  HEENT: Head is normocephalic and atraumatic.  Neck: Supple, no visible swelling.  Cardiovascular: No palpable tachycardia  Lungs: No audible wheezing or labored breathing  Abdomen: Nondistended.  Extremities: Evaluation of the left upper extremity finds the patient had palpable radial artery at the wrist with brisk capillary refill to all digits.  Patient has intact sensation to axillary radial median and ulnar nerves.  There are no open wounds.  There are no signs of infection.  There is no evidence of lymphedema or lymphatic streaking.  The patient has supple compartments to left arm forearm and hand.  Focal tenderness to left wrist over first dorsal compartment.  Pain with Finkelstein's maneuver and with active left thumb motion.      Radiology: X-rays of the left wrist show no acute fracture or dislocation      Assessment: Left wrist first dorsal compartment tenosynovitis      Plan: Treatment options were discussed.  We talked about formal therapy with or without steroid injection.  Patient elects proceed forth with formal therapy and for 8-week follow-up.  If still symptomatic at that time she will consider steroid injection to first dorsal compartment tendon sheath.  No x-rays upon return.        Procedure:

## 2024-10-17 ENCOUNTER — OFFICE VISIT (OUTPATIENT)
Dept: URGENT CARE | Age: 19
End: 2024-10-17
Payer: COMMERCIAL

## 2024-10-17 VITALS
BODY MASS INDEX: 22.08 KG/M2 | RESPIRATION RATE: 16 BRPM | TEMPERATURE: 98.1 F | OXYGEN SATURATION: 100 % | HEIGHT: 62 IN | WEIGHT: 120 LBS | DIASTOLIC BLOOD PRESSURE: 68 MMHG | HEART RATE: 106 BPM | SYSTOLIC BLOOD PRESSURE: 123 MMHG

## 2024-10-17 DIAGNOSIS — S16.1XXA CERVICAL MUSCLE STRAIN, INITIAL ENCOUNTER: Primary | ICD-10-CM

## 2024-10-17 DIAGNOSIS — M62.838 CERVICAL PARASPINAL MUSCLE SPASM: ICD-10-CM

## 2024-10-17 DIAGNOSIS — M54.2 CERVICALGIA: ICD-10-CM

## 2024-10-17 RX ORDER — ORPHENADRINE CITRATE 100 MG/1
100 TABLET, EXTENDED RELEASE ORAL NIGHTLY PRN
Qty: 10 TABLET | Refills: 0 | Status: SHIPPED | OUTPATIENT
Start: 2024-10-17 | End: 2024-10-24

## 2024-10-17 RX ORDER — NAPROXEN 500 MG/1
500 TABLET ORAL
Qty: 10 TABLET | Refills: 0 | Status: SHIPPED | OUTPATIENT
Start: 2024-10-17 | End: 2024-10-22

## 2024-10-17 RX ORDER — LIDOCAINE 50 MG/G
1 PATCH TOPICAL DAILY
Qty: 10 PATCH | Refills: 0 | Status: SHIPPED | OUTPATIENT
Start: 2024-10-17

## 2024-10-17 ASSESSMENT — PAIN SCALES - GENERAL: PAINLEVEL_OUTOF10: 5

## 2024-10-17 NOTE — PROGRESS NOTES
"Subjective   Patient ID: Derick Finnegan is a 19 y.o. female. They present today with a chief complaint of Neck Pain (For 2 days, unable to turn neck, pain).    History of Present Illness  Derick is a right-hand-dominant 19-year-old female who presents to the urgent care for evaluation of nontraumatic neck discomfort and stiffness for 2 days duration.  Patient states she has been having trouble with work due to the stiffness however denies any numbness, tingling or visual disturbances or weakness.  Patient is seeking evaluation, reassurance, treatment and work note.  Patient has attempted some Tylenol without significant improvement of symptoms.  No other symptoms or concerns otherwise reported.      Past Medical History  Allergies as of 10/17/2024    (No Known Allergies)       (Not in a hospital admission)       History reviewed. No pertinent past medical history.    Past Surgical History:   Procedure Laterality Date    OTHER SURGICAL HISTORY  01/04/2023    Throat surgery        reports that she has been smoking cigarettes. She has never used smokeless tobacco. She reports current alcohol use. She reports current drug use. Drug: Marijuana.    Review of Systems  A 10-point review of systems was performed, otherwise unremarkable unless stated in the history of present illness.                Objective    Vitals:    10/17/24 1849   BP: 123/68   BP Location: Left arm   Patient Position: Sitting   BP Cuff Size: Adult   Pulse: 106   Resp: 16   Temp: 36.7 °C (98.1 °F)   TempSrc: Oral   SpO2: 100%   Weight: 54.4 kg (120 lb)   Height: 1.575 m (5' 2\")     Patient's last menstrual period was 10/06/2024 (approximate).    Gen: Vitals noted and reviewed, no evidence of acute distress, well developed and afebrile.   Psych: Mood and affect appropriate for setting.  Head/Face: Atraumatic and normocephalic.   Neuro: Sensation intact. No focal deficits noted.  Cervical spine exam: Range of motion full in flexion, extension, R/L " sidebending and R/L rotation. Inspection without obvious signs of bony deformity or muscle atrophy. No TTP over the midline cervical spine/spinous processes. +TTP with hypertoncitiy over the b/l L>R paraspinous musculature and upper trapezius. Bilateral UE strength full throughout, normal  strength, no wrist drop. Normal sensation to light touch UE. Gait: normal coordination. Spurling's maneuver: negative.  Cardiac: Regular rate and rhythm no murmur.   Lungs: Clear to auscultation throughout, No evidence of wheezing, rhonchi or crackles. Good aeration throughout.   Extremities: Symmetrical, No peripheral edema  Skin: Without evidence of ecchymosis, wounds, or rashes.        Point of Care Test & Imaging Results from this visit  No results found for this visit on 10/17/24.   No results found.    Diagnostic study results (if any) were reviewed by Valeria Blair DO.    Assessment/Plan   Allergies, medications, history, and pertinent labs/EKGs/Imaging reviewed by Valeria Blair DO.     Medical Decision Making  Discussed with the patient symptoms and clinical presentation findings suggestive of an acute paraspinal muscle strain with spasms.  We advise close symptom monitoring supportive treatment and activity modifications.  We agreed to prescribe a muscle relaxer along with naproxen for added symptom relief.  We advised on risks of taking naproxen while female and to avoid combining other NSAIDs with this. Follow up with PCP. We advised seeking immediate emergency medical attention if symptoms fail to improve, worsen or any concerning symptoms arise. Patient voiced full understanding and agreement to plan.    Orders and Diagnoses  Diagnoses and all orders for this visit:  Cervical muscle strain, initial encounter  -     naproxen (Naprosyn) 500 mg tablet; Take 1 tablet (500 mg) by mouth 2 times daily (morning and late afternoon) for 5 days. Take with food. No other NSAIDs with this  -     lidocaine (Lidoderm) 5 % patch;  Place 1 patch over 12 hours on the skin once daily. Remove & discard patch within 12 hours or as directed by MD.  Cervicalgia  -     naproxen (Naprosyn) 500 mg tablet; Take 1 tablet (500 mg) by mouth 2 times daily (morning and late afternoon) for 5 days. Take with food. No other NSAIDs with this  Cervical paraspinal muscle spasm  -     orphenadrine (Norflex) 100 mg 12 hr tablet; Take 1 tablet (100 mg) by mouth as needed at bedtime for muscle spasms for up to 7 days. Do not crush, chew, or split.      Medical Admin Record      Patient disposition: Home    Electronically signed by Valeria Blair DO  8:07 PM

## 2024-10-17 NOTE — LETTER
October 17, 2024     Patient: Derick Finnegan   YOB: 2005   Date of Visit: 10/17/2024       To Whom It May Concern:    Derick Finnegan was seen in my clinic on 10/17/2024 at 7:20 pm. Please excuse Derick for her absence from work on this day to make the appointment.    If you have any questions or concerns, please don't hesitate to call.         Sincerely,         Valeria Blair,         CC: No Recipients

## 2025-01-07 ENCOUNTER — OFFICE VISIT (OUTPATIENT)
Dept: URGENT CARE | Age: 20
End: 2025-01-07
Payer: COMMERCIAL

## 2025-01-07 VITALS
DIASTOLIC BLOOD PRESSURE: 67 MMHG | RESPIRATION RATE: 20 BRPM | BODY MASS INDEX: 22.08 KG/M2 | SYSTOLIC BLOOD PRESSURE: 102 MMHG | WEIGHT: 120 LBS | HEIGHT: 62 IN | OXYGEN SATURATION: 98 % | HEART RATE: 100 BPM | TEMPERATURE: 97.9 F

## 2025-01-07 DIAGNOSIS — L73.2 HIDRADENITIS SUPPURATIVA OF MULTIPLE SITES: Primary | ICD-10-CM

## 2025-01-07 PROCEDURE — 99213 OFFICE O/P EST LOW 20 MIN: CPT

## 2025-01-07 PROCEDURE — 3008F BODY MASS INDEX DOCD: CPT

## 2025-01-07 RX ORDER — DOXYCYCLINE 100 MG/1
100 CAPSULE ORAL EVERY 12 HOURS
Qty: 14 CAPSULE | Refills: 0 | Status: SHIPPED | OUTPATIENT
Start: 2025-01-07 | End: 2025-01-14

## 2025-01-07 NOTE — LETTER
January 7, 2025     Patient: Derick Finnegan   YOB: 2005   Date of Visit: 1/7/2025       To Whom It May Concern:    It is my medical opinion that Derick Finnegan  return to work 1/8/25. Please excuse Derick from work 1/6/25 and 1/7/25 .    If you have any questions or concerns, please don't hesitate to call.         Sincerely,        Antoinette Graham PA-C    CC: No Recipients

## 2025-01-08 ASSESSMENT — ENCOUNTER SYMPTOMS: FEVER: 0

## 2025-01-08 NOTE — PROGRESS NOTES
Subjective   Patient ID: Derick Finnegan is a 19 y.o. female. They present today with a chief complaint of Other (L side arm pit abscess.).    HISTORY OF PRESENT ILLNESS:    Patient with history of hydradenitis suppurativa who presents to clinic with flare involving bilateral axillae (L>R) for the past several days, unrelieved with warm compresses. States she is prescribed clindamycin gel and has enough of this medication at home but has not yet tried it for this flare. Denies fevers or other systemic sx. Established with dermatology.     Past Medical History  Allergies as of 01/07/2025    (No Known Allergies)       Current Outpatient Medications   Medication Instructions    benzoyl peroxide (Benzac AC) 10 % external wash 1 Application, Topical, Daily    doxycycline (VIBRAMYCIN) 100 mg, oral, Every 12 hours, Take with at least 8 ounces (large glass) of water, do not lie down for 30 minutes after    levocetirizine dihydrochloride (XYZAL ORAL) Xyzal TABS   Refills: 0       Active    lidocaine (Lidoderm) 5 % patch 1 patch, transdermal, Daily, Remove & discard patch within 12 hours or as directed by MD.    lidocaine HCL (Aspercreme, lidocaine HCL,) 4 % cream APPLY 2 INCHES 3 times daily    norethindrone-e.estradioL-iron (Junel Fe 24) 1 mg-20 mcg (24)/75 mg (4) tablet 1 tablet, oral, Daily    orphenadrine (NORFLEX) 100 mg, oral, Nightly PRN, Do not crush, chew, or split.    spironolactone (ALDACTONE) 50 mg, oral, Daily    tretinoin (Retin-A) 0.025 % cream Topical, Nightly    ZINC ORAL No dose, route, or frequency recorded.         No past medical history on file.    Past Surgical History:   Procedure Laterality Date    OTHER SURGICAL HISTORY  01/04/2023    Throat surgery        reports that she has been smoking cigarettes. She has never used smokeless tobacco. She reports current alcohol use. She reports current drug use. Drug: Marijuana.    Review of Systems    Review of Systems   Constitutional:  Negative for fever.  "  Skin:         (+) abscess   All other systems reviewed and are negative.                                Objective    Vitals:    01/07/25 1826   BP: 102/67   BP Location: Left arm   Patient Position: Sitting   BP Cuff Size: Adult   Pulse: 100   Resp: 20   Temp: 36.6 °C (97.9 °F)   TempSrc: Oral   SpO2: 98%   Weight: 54.4 kg (120 lb)   Height: 1.575 m (5' 2\")     Patient's last menstrual period was 12/31/2024.  PHYSICAL EXAMINATION:    Physical Exam  Vitals and nursing note reviewed.   Constitutional:       General: She is not in acute distress.     Appearance: Normal appearance. She is not ill-appearing.   HENT:      Head: Normocephalic and atraumatic.      Nose: Nose normal.   Eyes:      General:         Right eye: No discharge.         Left eye: No discharge.      Extraocular Movements: Extraocular movements intact.      Conjunctiva/sclera: Conjunctivae normal.   Cardiovascular:      Rate and Rhythm: Normal rate.   Pulmonary:      Effort: Pulmonary effort is normal. No respiratory distress.   Musculoskeletal:      Cervical back: Normal range of motion and neck supple.   Skin:     General: Skin is warm and dry.      Findings: Abscess present.      Comments: (+) multiple furuncles and few carbuncles appreciated in bilateral axillary region, left side more intense compared to right side. (+) tenderness and induration. Consistent with HS flare. No lymphangitic streaking.   Neurological:      General: No focal deficit present.      Mental Status: She is alert and oriented to person, place, and time.   Psychiatric:         Mood and Affect: Mood normal.         Behavior: Behavior normal.        Procedures    No results found for this visit on 01/07/25.    Diagnostic study results (if any) were reviewed by Antoinette Graham PA-C.    Assessment/Plan   Allergies, medications, history, and pertinent labs/EKGs/Imaging reviewed by Antoinette Graham PA-C.     Orders and Diagnoses  Diagnoses and all orders for this " visit:  Hidradenitis suppurativa of multiple sites  -     doxycycline (Vibramycin) 100 mg capsule; Take 1 capsule (100 mg) by mouth every 12 hours for 7 days. Take with at least 8 ounces (large glass) of water, do not lie down for 30 minutes after      Medical Admin Record    Given overall well appearance, vital signs, history, and exam as above, there is no indication for further emergent testing/intervention at this time.      I discussed with the patient my clinical thoughts at this time given the above and we had a shared decision-making conversation in a patient-centered decision-making model on how to proceed forward. Pt was instructed on the importance of close follow-up. They were told that an urgent care diagnosis is often a preliminary impression and that definitive care is often not able to be given in the urgent care setting. Pt was educated that close follow-up is essential for good health and good outcomes. Patient was provided with the following instructions:         Take oral abx as directed.       Apply clindamycin gel twice daily for 7 days and continue to manage condition as directed by dermatology.     Plenty of fluids and rest.      Warm compress applied to area several times daily.     Follow up with dermatology or RTC in the next 7 days if sx fail to show adequate improvement.        Clinical impression as well as limitations of available testing/examination, all discussed with patient. Pt is well at this time in the urgent care. They are comfortable with the present assessment and plan to be discharged home. Discussed with them close outpatient follow up, reassessment, and possible further testing/treatment via their PCP/specialist if symptoms fail to improve; they agree, understand, and are comfortable with this plan. Pt given the opportunity to ask questions prior to discharge and all questions were answered at this time. Via our discussion, the patient was advised of warning signs and  instructions were reviewed. Strict ED precautions were given, acknowledged, and understood. Discussed with the patient/family that it is okay to return to the urgent care at any time for anything. Advised to present to the ED if present condition changes/worsens or if they develop new symptoms at any time after discharge. Also, advised to go to the ED if they cannot establish outpatient follow-up in time frame specified above. Pt verbalized understanding and agreement with plan and instructions. Discussed the need for close follow up with their primary care provider and/or specialist for further testing/treatment/care/consultation in the short time frame as noted above, if needed - they understand these instructions and agree to close follow up for these reasons. Patient discharged home in stable condition.      Follow Up Instructions  No follow-ups on file.    Patient disposition: Home    Electronically signed by Antoinette Graham PA-C  2:07 PM

## 2025-03-10 ENCOUNTER — OFFICE VISIT (OUTPATIENT)
Dept: URGENT CARE | Age: 20
End: 2025-03-10
Payer: COMMERCIAL

## 2025-03-10 VITALS
SYSTOLIC BLOOD PRESSURE: 107 MMHG | HEART RATE: 94 BPM | DIASTOLIC BLOOD PRESSURE: 59 MMHG | WEIGHT: 125 LBS | BODY MASS INDEX: 23 KG/M2 | TEMPERATURE: 98.8 F | HEIGHT: 62 IN | RESPIRATION RATE: 17 BRPM | OXYGEN SATURATION: 98 %

## 2025-03-10 DIAGNOSIS — L73.2 HIDRADENITIS SUPPURATIVA OF MULTIPLE SITES: Primary | ICD-10-CM

## 2025-03-10 PROCEDURE — 99213 OFFICE O/P EST LOW 20 MIN: CPT | Performed by: FAMILY MEDICINE

## 2025-03-10 RX ORDER — CLINDAMYCIN PHOSPHATE 1 G/10ML
1 GEL TOPICAL 2 TIMES DAILY
Qty: 20 ML | Refills: 0 | Status: SHIPPED | OUTPATIENT
Start: 2025-03-10

## 2025-03-10 NOTE — PROGRESS NOTES
Subjective   Patient ID: Derick Finnegan is a 19 y.o. female. They present today with a chief complaint of Rash (HS breakout).    Patient disposition: Home    History of Present Illness  HPI  Rash on bilateral axilla for the past few days.  Patient with history of hidradenitis suppurativa and occasion gets better.  Follow-up with dermatology is a few months out.  Use the is given topical antibiotics and symptoms improved.  Has not needed to have them drained in the past.  No fever or chills.  No current drainage.  No other medications taken.      Past Medical History  Allergies as of 03/10/2025    (No Known Allergies)       (Not in a hospital admission)       Current Outpatient Medications   Medication Sig Dispense Refill    benzoyl peroxide (Benzac AC) 10 % external wash Apply 1 Application topically once daily. 237 g 11    levocetirizine dihydrochloride (XYZAL ORAL) Xyzal TABS   Refills: 0       Active      lidocaine (Lidoderm) 5 % patch Place 1 patch over 12 hours on the skin once daily. Remove & discard patch within 12 hours or as directed by MD. 10 patch 0    lidocaine HCL (Aspercreme, lidocaine HCL,) 4 % cream APPLY 2 INCHES 3 times daily      norethindrone-e.estradioL-iron (Junel Fe 24) 1 mg-20 mcg (24)/75 mg (4) tablet Take 1 tablet by mouth once daily. 84 tablet 1    orphenadrine (Norflex) 100 mg 12 hr tablet Take 1 tablet (100 mg) by mouth as needed at bedtime for muscle spasms for up to 7 days. Do not crush, chew, or split. 10 tablet 0    spironolactone (Aldactone) 50 mg tablet Take 1 tablet (50 mg) by mouth once daily. 90 tablet 1    tretinoin (Retin-A) 0.025 % cream Apply topically once daily at bedtime. 20 g 1    ZINC ORAL        No current facility-administered medications for this visit.       Patient Active Problem List   Diagnosis    Allergic rhinitis    Dysmenorrhea    Muscle spasm       Past Surgical History:   Procedure Laterality Date    OTHER SURGICAL HISTORY  01/04/2023    Throat surgery         "reports that she has been smoking cigarettes. She has never used smokeless tobacco. She reports current alcohol use. She reports current drug use. Drug: Marijuana.    Review of Systems  As noted in HPI. ROS otherwise negative unless noted.       Objective    Vitals:    03/10/25 1635   BP: 107/59   Pulse: 94   Resp: 17   Temp: 37.1 °C (98.8 °F)   TempSrc: Oral   SpO2: 98%   Weight: 56.7 kg (125 lb)   Height: 1.575 m (5' 2\")     Patient's last menstrual period was 02/27/2025.    Physical Exam  Constitutional: vital signs reviewed. Well developed, well nourished. patient alert and patient without distress.   Psych: Normal mood and affect  Head and Face: Normal and atraumatic.    Cardiovascular: Heart rate normal, normal S1 and S2, no gallops, no murmurs and no pericardial rub. Rhythm: Normal.  Pulmonary: No respiratory distress. Palpation of chest: Normal. Clear bilateral breath sounds.   Skin: Normal skin color and pigmentation, normal skin turgor, and no rash.  Bilateral axilla, left more than right with 2 medium size abscess, no fluctuance.  No drainage.          Procedures    Point of Care Test & Imaging Results from this visit  Results for orders placed or performed in visit on 06/27/24   Urine Culture    Collection Time: 06/27/24 10:58 AM    Specimen: Clean Catch/Voided; Urine   Result Value Ref Range    Urine Culture No significant growth             Diagnostic study results (if any) were reviewed.    Assessment/Plan   Allergies, medications, history, and pertinent labs/EKGs/Imaging reviewed.    Medical Decision Making  See note    Orders and Diagnoses  There are no diagnoses linked to this encounter.    Medical Admin Record      Follow Up Instructions  No follow-ups on file.    At time of discharge patient was clinically well-appearing and HDS for outpatient management. The patient and/or family was educated regarding diagnosis, supportive care, OTC and Rx medications. The patient and/or family was given the " opportunity to ask questions prior to discharge and all questions answered. They verbalized understanding of my discussion of the plans for treatment, expected course, indications to return to UC or seek further evaluation in ED, and the need for timely follow up as directed.      Electronically signed by [facility]

## 2025-03-10 NOTE — PATIENT INSTRUCTIONS
Warm compress to the area.    Apply the topical antibiotic ointment as directed.    Ibuprofen/Tylenol as needed for pain relief.

## 2025-03-10 NOTE — LETTER
March 10, 2025     Patient: Derick Finnegan   YOB: 2005   Date of Visit: 3/10/2025       To Whom It May Concern:    Derick Finnegan was seen in my clinic on 3/10/2025 at 4:40 pm. Please excuse Derick for her absence from work on this day to make the appointment.    If you have any questions or concerns, please don't hesitate to call.         Sincerely,         [facility]        CC: No Recipients

## 2025-03-24 NOTE — ED NOTES
Management per Principal Problem    No associated orders from this encounter found during lookback period of 72 hours.   Patient left without discharge     Shweta Clemons RN  02/02/24 [id number]

## 2025-04-05 ENCOUNTER — HOSPITAL ENCOUNTER (EMERGENCY)
Facility: HOSPITAL | Age: 20
Discharge: HOME | End: 2025-04-05
Payer: COMMERCIAL

## 2025-04-05 VITALS
WEIGHT: 120 LBS | SYSTOLIC BLOOD PRESSURE: 97 MMHG | TEMPERATURE: 98.6 F | DIASTOLIC BLOOD PRESSURE: 52 MMHG | HEART RATE: 73 BPM | BODY MASS INDEX: 22.08 KG/M2 | OXYGEN SATURATION: 100 % | HEIGHT: 62 IN | RESPIRATION RATE: 16 BRPM

## 2025-04-05 DIAGNOSIS — N64.4 NIPPLE PAIN: ICD-10-CM

## 2025-04-05 DIAGNOSIS — L73.2 HIDRADENITIS SUPPURATIVA: Primary | ICD-10-CM

## 2025-04-05 DIAGNOSIS — L02.419 AXILLARY ABSCESS: ICD-10-CM

## 2025-04-05 PROCEDURE — 99283 EMERGENCY DEPT VISIT LOW MDM: CPT | Performed by: PHYSICIAN ASSISTANT

## 2025-04-05 PROCEDURE — 99281 EMR DPT VST MAYX REQ PHY/QHP: CPT

## 2025-04-05 PROCEDURE — 2500000001 HC RX 250 WO HCPCS SELF ADMINISTERED DRUGS (ALT 637 FOR MEDICARE OP): Performed by: PHYSICIAN ASSISTANT

## 2025-04-05 RX ORDER — CLINDAMYCIN PHOSPHATE 11.9 MG/ML
SOLUTION TOPICAL 2 TIMES DAILY
Qty: 30 ML | Refills: 0 | Status: SHIPPED | OUTPATIENT
Start: 2025-04-05 | End: 2025-04-12

## 2025-04-05 RX ORDER — DOXYCYCLINE 100 MG/1
100 CAPSULE ORAL 2 TIMES DAILY
Qty: 14 CAPSULE | Refills: 0 | Status: SHIPPED | OUTPATIENT
Start: 2025-04-05 | End: 2025-04-12

## 2025-04-05 RX ORDER — DOXYCYCLINE 100 MG/1
100 CAPSULE ORAL ONCE
Status: COMPLETED | OUTPATIENT
Start: 2025-04-05 | End: 2025-04-05

## 2025-04-05 RX ADMIN — DOXYCYCLINE HYCLATE 100 MG: 100 CAPSULE ORAL at 19:34

## 2025-04-05 ASSESSMENT — LIFESTYLE VARIABLES
HAVE PEOPLE ANNOYED YOU BY CRITICIZING YOUR DRINKING: NO
TOTAL SCORE: 0
HAVE YOU EVER FELT YOU SHOULD CUT DOWN ON YOUR DRINKING: NO
EVER HAD A DRINK FIRST THING IN THE MORNING TO STEADY YOUR NERVES TO GET RID OF A HANGOVER: NO
EVER FELT BAD OR GUILTY ABOUT YOUR DRINKING: NO

## 2025-04-05 ASSESSMENT — PAIN DESCRIPTION - ORIENTATION: ORIENTATION: RIGHT

## 2025-04-05 ASSESSMENT — COLUMBIA-SUICIDE SEVERITY RATING SCALE - C-SSRS
2. HAVE YOU ACTUALLY HAD ANY THOUGHTS OF KILLING YOURSELF?: NO
1. IN THE PAST MONTH, HAVE YOU WISHED YOU WERE DEAD OR WISHED YOU COULD GO TO SLEEP AND NOT WAKE UP?: NO
6. HAVE YOU EVER DONE ANYTHING, STARTED TO DO ANYTHING, OR PREPARED TO DO ANYTHING TO END YOUR LIFE?: NO

## 2025-04-05 ASSESSMENT — PAIN SCALES - GENERAL
PAINLEVEL_OUTOF10: 7
PAINLEVEL_OUTOF10: 8

## 2025-04-05 ASSESSMENT — PAIN DESCRIPTION - DESCRIPTORS: DESCRIPTORS: BURNING

## 2025-04-05 ASSESSMENT — PAIN DESCRIPTION - LOCATION: LOCATION: OTHER (COMMENT)

## 2025-04-05 ASSESSMENT — PAIN - FUNCTIONAL ASSESSMENT: PAIN_FUNCTIONAL_ASSESSMENT: 0-10

## 2025-04-05 NOTE — DISCHARGE INSTRUCTIONS
You were seen in the emergency department for evaluation of right nipple pain.    I suspect there is an abscess under your right nipple.  He also had an abscess under the left armpit.    You were prescribed doxycycline and topical clindamycin.  You should also follow-up with your PCP or dermatology in the next couple days to make sure your symptoms are improving.  You are also given a referral to see a breast specialist.    Please follow-up with your primary care provider.  If you do not have a primary care provider you can call [phone] to make an appointment.    Please do not hesitate to return to the ED if symptoms change or worsen.

## 2025-04-05 NOTE — ED PROVIDER NOTES
HPI   Chief Complaint   Patient presents with   • Breast Discharge     Pt states noticed pustular discharge out of right nipple/pain that started Wed. NKI. Removed piercing        HPI  Patient is a 19-year-old female with a past medical history of adenitis suppurativa that presents to the ED for evaluation of right nipple pain.  Patient reports she had a left axillary abscess that showed up around Monday and is tender.  She has a history of a abscess under the left breast but has never had any abscesses to the breasts 4.  She had her nipples pierced in January 2024 and switched out the piercing in October/November.  She noticed Wednesday she was having tenderness and irritation around the right piercing.  Symptoms worsened on Thursday and she removed the piercing and had some yellow purulent drainage.  She is no longer having any drainage or warmth but reports there is persistent pain around the nipple.  She denies fever, chills, nausea, vomiting, shortness of breath, chest pains.  She is not breast-feeding and has not been recently pregnant.  Finished her LMP at the end of last month and it was normal.  No chance of pregnancy.      Patient History   History reviewed. No pertinent past medical history.  Past Surgical History:   Procedure Laterality Date   • OTHER SURGICAL HISTORY  01/04/2023    Throat surgery     No family history on file.  Social History     Tobacco Use   • Smoking status: Never   • Smokeless tobacco: Never   Vaping Use   • Vaping status: Never Used   Substance Use Topics   • Alcohol use: Yes     Comment: Socially   • Drug use: Not Currently     Types: Marijuana       Physical Exam   ED Triage Vitals [04/05/25 1732]   Temperature Heart Rate Respirations BP   36.7 °C (98.1 °F) (!) 101 16 121/58      Pulse Ox Temp Source Heart Rate Source Patient Position   100 % Temporal -- Sitting      BP Location FiO2 (%)     Right arm --       Physical Exam  Vitals reviewed.   Constitutional:       General: She  is not in acute distress.     Appearance: Normal appearance. She is normal weight. She is not ill-appearing or toxic-appearing.   HENT:      Head: Normocephalic and atraumatic.   Cardiovascular:      Rate and Rhythm: Normal rate and regular rhythm.      Pulses: Normal pulses.      Heart sounds: Normal heart sounds. No murmur heard.  Pulmonary:      Effort: Pulmonary effort is normal. No respiratory distress.      Breath sounds: Normal breath sounds. No stridor. No wheezing or rhonchi.   Musculoskeletal:      Cervical back: Neck supple.   Skin:     General: Skin is warm and dry.      Capillary Refill: Capillary refill takes less than 2 seconds.      Comments: Breast exam completed with chaperone present is significant for raised swollen right nipple when compared to the left.  Area is tender to touch.  There is no surrounding erythema or active drainage.  She also has an area of induration to the left axilla that is erythematous and tender to palpation.   Neurological:      General: No focal deficit present.      Mental Status: She is alert and oriented to person, place, and time.   Psychiatric:         Mood and Affect: Mood normal.         Behavior: Behavior normal.           ED Course & MDM   Diagnoses as of 04/05/25 1928   Hidradenitis suppurativa   Axillary abscess   Nipple pain                 No data recorded     [place] Coma Scale Score: 15 (04/05/25 1736 : Rosario Slaughter RN)                           Medical Decision Making  Patient is a 19-year-old female with a past medical history of adenitis suppurativa that presents to the ED for evaluation of right nipple pain.  On exam patient is well-appearing in no acute distress.  Vital signs are stable.  Cardiopulmonary exam largely unremarkable.Breast exam completed with chaperone present is significant for raised swollen right nipple when compared to the left.  Area is tender to touch.  There is no surrounding erythema or active drainage.  She also has an  area of induration to the left axilla that is erythematous and tender to palpation.  Differential includes was not limited to abscess, cellulitis, mastitis.    Given patient's history of hidradenitis suppurativa will not attempt to drain abscesses today.  Will put her on a course of doxycycline and topical clindamycin.  Will also give her breast follow-up if symptoms do not clear with antibiotics for consideration of other etiologies.    Social determinants of health affecting care:  None     Patient was informed of the aforementioned findings.  Symptoms are felt to be due to cellulitis/abscess.  Patient will be prescribed doxycycline and topical clindamycin.     At this time, low suspicion for an acute process that would necessitate further emergent workup, urgent specialist consultation, or hospitalization.  Based on clinical workup, the disposition of discharge is appropriate.  Advised patient to pursue close follow-up with PCP.  Patient was provided with appropriate information to assist in obtaining the proper follow-up.  Discussed strict return to ED protocols with patient, including low threshold to return for changing/worsening symptoms.  Patient demonstrated understanding and agreement with the plan of care, and all questions answered prior to discharge.  Patient stable at time of discharge.     --------------------------------------------------------------------------------------------------------------------------------------------------------------------------------------------------------------------------    This note was dictated using a speech recognition program.  While an attempt was made at proof reading to minimize errors, minor errors in transcription may be present call for questions.     Procedure  Procedures     Catarino Gilmore PA-C  04/05/25 1930

## 2025-04-07 ENCOUNTER — PREP FOR PROCEDURE (OUTPATIENT)
Dept: SURGERY | Facility: HOSPITAL | Age: 20
End: 2025-04-07

## 2025-04-07 ENCOUNTER — OFFICE VISIT (OUTPATIENT)
Dept: SURGERY | Facility: CLINIC | Age: 20
End: 2025-04-07
Payer: COMMERCIAL

## 2025-04-07 ENCOUNTER — APPOINTMENT (OUTPATIENT)
Dept: ORTHOPEDIC SURGERY | Facility: CLINIC | Age: 20
End: 2025-04-07
Payer: COMMERCIAL

## 2025-04-07 VITALS
WEIGHT: 120 LBS | HEIGHT: 62 IN | SYSTOLIC BLOOD PRESSURE: 126 MMHG | DIASTOLIC BLOOD PRESSURE: 70 MMHG | HEART RATE: 91 BPM | OXYGEN SATURATION: 98 % | BODY MASS INDEX: 22.08 KG/M2

## 2025-04-07 DIAGNOSIS — S69.92XA SCAPHOLUNATE LIGAMENT INJURY WITH NO INSTABILITY, LEFT, INITIAL ENCOUNTER: ICD-10-CM

## 2025-04-07 DIAGNOSIS — N61.0 BREAST INFECTION: Primary | ICD-10-CM

## 2025-04-07 DIAGNOSIS — N61.0 CELLULITIS OF FEMALE BREAST: Primary | ICD-10-CM

## 2025-04-07 PROCEDURE — 99203 OFFICE O/P NEW LOW 30 MIN: CPT | Performed by: INTERNAL MEDICINE

## 2025-04-07 PROCEDURE — 99202 OFFICE O/P NEW SF 15 MIN: CPT | Performed by: SURGERY

## 2025-04-07 PROCEDURE — 3008F BODY MASS INDEX DOCD: CPT | Performed by: SURGERY

## 2025-04-07 PROCEDURE — 1036F TOBACCO NON-USER: CPT | Performed by: SURGERY

## 2025-04-07 PROCEDURE — 1036F TOBACCO NON-USER: CPT | Performed by: INTERNAL MEDICINE

## 2025-04-07 RX ORDER — SULFAMETHOXAZOLE AND TRIMETHOPRIM 800; 160 MG/1; MG/1
1 TABLET ORAL 2 TIMES DAILY
Qty: 14 TABLET | Refills: 0 | Status: SHIPPED | OUTPATIENT
Start: 2025-04-07 | End: 2025-04-14

## 2025-04-07 RX ORDER — CEPHALEXIN 500 MG/1
500 CAPSULE ORAL 4 TIMES DAILY
Qty: 28 CAPSULE | Refills: 0 | Status: SHIPPED | OUTPATIENT
Start: 2025-04-07 | End: 2025-04-14

## 2025-04-07 NOTE — PROGRESS NOTES
Acute Injury New Patient Visit    CC: No chief complaint on file.      HPI: Derick is a 19 y.o. female presents to the for increased swelling and pain of her right breast.  Has a past medical history of a nipple piercing which once she changed noticed swelling and drainage.  Patient was evaluated the emergency room, placed on doxycycline and was arranged to follow-up at the orthopedic walk-in injury clinic.  Patient also scheduled with surgical oncology in several weeks.        Review of Systems   GENERAL: Negative for malaise, significant weight loss, fever  MUSCULOSKELETAL: See HPI  NEURO:  Negative for numbness / tingling     Past Medical History  No past medical history on file.    Medication review  Medication Documentation Review Audit       Reviewed by Rosario Slaughter RN (Registered Nurse) on 25 at 1735      Medication Order Taking? Sig Documenting Provider Last Dose Status   benzoyl peroxide (Benzac AC) 10 % external wash [id number]  Apply 1 Application topically once daily. CARLOS Pringle-CNP   25 235   clindamycin phosphate (Clindagel) 1 % gel, once daily [id number]  Apply 1 g topically 2 times a day. [place], DO  Active   levocetirizine dihydrochloride (XYZAL ORAL) [id number]  Xyzal TABS   Refills: 0       Active Historical Provider, MD  Active   lidocaine (Lidoderm) 5 % patch [id number]  Place 1 patch over 12 hours on the skin once daily. Remove & discard patch within 12 hours or as directed by MD. Valeria Blair, DO  Active   lidocaine HCL (Aspercreme, lidocaine HCL,) 4 % cream [id number]  APPLY 2 INCHES 3 times daily Historical Provider, MD  Active   norethindrone-e.estradioL-iron (Junel Fe 24) 1 mg-20 mcg (24)/75 mg (4) tablet [id number]  Take 1 tablet by mouth once daily. Charis Anderson, CARLOS-CNM   24 2359   orphenadrine (Norflex) 100 mg 12 hr tablet [id number]  Take 1 tablet (100 mg) by mouth as needed at bedtime for muscle spasms for up to 7 days. Do  not crush, chew, or split. Valeria Blair DO   10/24/24 2359   spironolactone (Aldactone) 50 mg tablet [id number]  Take 1 tablet (50 mg) by mouth once daily. Prisca ROCHA CARLOS Nguyen-CNP   25 235   tretinoin (Retin-A) 0.025 % cream [id number]  Apply topically once daily at bedtime. Prisca ROCHA CARLOS Nguyen-CNP   25 235   ZINC ORAL [id number]   Historical Provider, MD  Active                    Allergies  No Known Allergies    Social History  Social History     Socioeconomic History    Marital status: Single     Spouse name: Not on file    Number of children: Not on file    Years of education: Not on file    Highest education level: Not on file   Occupational History    Not on file   Tobacco Use    Smoking status: Never    Smokeless tobacco: Never   Vaping Use    Vaping status: Never Used   Substance and Sexual Activity    Alcohol use: Yes     Comment: Socially    Drug use: Not Currently     Types: Marijuana    Sexual activity: Yes     Partners: Gender-Diverse   Other Topics Concern    Not on file   Social History Narrative    Not on file     Social Drivers of Health     Financial Resource Strain: Not on file   Food Insecurity: Not on file   Transportation Needs: Not on file   Physical Activity: Not on file   Stress: Not on file   Social Connections: Not on file   Intimate Partner Violence: Not on file   Housing Stability: Not on file       Surgical History  Past Surgical History:   Procedure Laterality Date    OTHER SURGICAL HISTORY  2023    Throat surgery       Physical Exam:  GENERAL:  Patient is awake, alert, and oriented to person place and time.  Patient appears well nourished and well kept.  Affect Calm, Not Acutely Distressed.  HEENT:  Normocephalic, Atraumatic, EOMI  CARDIOVASCULAR:  Hemodynamically stable.  RESPIRATORY:  Normal respirations with unlabored breathing.  Extremity: Right breast shows no active drainage.  Significant tenderness to palpate over the areola and nipple, with  erythema.  Induration on palpation.  Diffuse tenderness over the soft tissue.      Diagnostics: None today  XR wrist left 3+ views  Narrative: STUDY:  Wrist Radiographs; 09/13/2024 8:52 PM  INDICATION:  Wrist pain.  COMPARISON:  None available.  ACCESSION NUMBER(S):  [id number]  ORDERING CLINICIAN:  JULI MCCOY  TECHNIQUE:  Four views of the left wrist.  FINDINGS:    Negative ulnar variance.  No acute displaced fracture.  The  scapholunate interval appears borderline widened at 3 to 4 mm.  The  scaphoid is rotated in the palmar direction.  Joint spaces are  well-maintained.  Soft tissues show no concerning finding.  Impression: Findings suggest scapholunate ligament injury which can be further  investigated with MRI of the wrist.  No acute displaced fracture..  Signed by Cornelius Woo MD      Procedure: None    Assessment: Right breast infection    Plan: Derick presents today for for right breast infection, sent by the emergency room to our acute walk-in orthopedic injury clinic.  We did recommend better broad-spectrum antibiotic coverage with Keflex and Bactrim.  Will refer to general surgery/wound care for further evaluation and appropriate treatment.    No orders of the defined types were placed in this encounter.     At the conclusion of the visit there were no further questions by the patient/family regarding their plan of care.  Patient was instructed to call or return with any issues, questions, or concerns regarding their injury and/or treatment plan described above.     04/07/25 at 9:25 AM - Owen Roberto MD    Office: [phone]    This note was prepared using voice recognition software.  The details of this note are correct and have been reviewed, and corrected to the best of my ability.  Some grammatical errors may persist related to the Dragon software.

## 2025-04-07 NOTE — PROGRESS NOTES
"Subjective   Patient ID: Derick Finnegan is a 19 y.o. female who presents for New Patient Visit.  HPI  19 year-old female patient who went to [facility] ER on April 5 with right nipple pain and found to have right breast abscess. She was discharged home on Doxycycline 100 mg BID. Over a year ago, she had bilateral nipples piercing with nipple rings. A week ago, she started having pain at the right nipple ring site. Per patient, the area was swollen and associated with excruciating sharp pain. She reports large amount of yellow tinged drainage yesterday which has helped the pain. Denies tobacco use; not breast feeding; grand mother with history of breast cancer       Objective   Physical Exam  Gen\" no acute distress  CV: RRR  Pulm: CTAB  Right breast (examine with female Medical Student Present): minimal tenderness at the lateral aspect of nipple with slight induration and erythema, no drainage   Assessment/Plan   Cellulitis. Continue the doxycycline and follow-up next week Monday         Alejandro Ceron MD 04/07/25 2:40 PM   "

## 2025-04-14 ENCOUNTER — APPOINTMENT (OUTPATIENT)
Dept: SURGERY | Facility: CLINIC | Age: 20
End: 2025-04-14
Payer: COMMERCIAL

## 2025-04-14 DIAGNOSIS — N61.0 CELLULITIS OF FEMALE BREAST: Primary | ICD-10-CM

## 2025-04-14 PROCEDURE — 99212 OFFICE O/P EST SF 10 MIN: CPT | Performed by: SURGERY

## 2025-04-14 NOTE — PROGRESS NOTES
Subjective   Patient ID: Derick Finnegan is a 19 y.o. female who presents for Follow-up.  HPI  19-year-old patient who I saw on April 7 for cellulitis to right breast; she has completed the doxycycline and here in follow-up with her mother.  She is doing well. Pain and drainage have resolved      Objective   Physical Exam  Right breast (examine with female MA); resolved infection  Assessment/Plan   No recurrent infection.  I told patient to avoid putting the ring in the right nipple for another 3 months.  Follow-up as needed         Alejandro Ceron MD 04/14/25 12:34 PM

## 2025-04-22 ENCOUNTER — APPOINTMENT (OUTPATIENT)
Dept: SURGICAL ONCOLOGY | Facility: CLINIC | Age: 20
End: 2025-04-22
Payer: COMMERCIAL

## 2025-06-16 ENCOUNTER — OFFICE VISIT (OUTPATIENT)
Dept: URGENT CARE | Age: 20
End: 2025-06-16
Payer: COMMERCIAL

## 2025-06-16 VITALS
HEART RATE: 89 BPM | HEIGHT: 62 IN | BODY MASS INDEX: 22.08 KG/M2 | DIASTOLIC BLOOD PRESSURE: 55 MMHG | OXYGEN SATURATION: 100 % | SYSTOLIC BLOOD PRESSURE: 114 MMHG | TEMPERATURE: 98 F | RESPIRATION RATE: 19 BRPM | WEIGHT: 120 LBS

## 2025-06-16 DIAGNOSIS — L73.2 HIDRADENITIS SUPPURATIVA OF MULTIPLE SITES: ICD-10-CM

## 2025-06-16 PROCEDURE — 99213 OFFICE O/P EST LOW 20 MIN: CPT | Performed by: FAMILY MEDICINE

## 2025-06-16 RX ORDER — CLINDAMYCIN PHOSPHATE 1 G/10ML
1 GEL TOPICAL 2 TIMES DAILY
Qty: 20 ML | Refills: 1 | Status: SHIPPED | OUTPATIENT
Start: 2025-06-16 | End: 2025-06-16

## 2025-06-16 RX ORDER — CLINDAMYCIN PHOSPHATE 1 G/10ML
1 GEL TOPICAL 2 TIMES DAILY
Qty: 20 ML | Refills: 0 | Status: SHIPPED | OUTPATIENT
Start: 2025-06-16

## 2025-06-16 NOTE — LETTER
June 16, 2025     Patient: Derick Finnegan   YOB: 2005   Date of Visit: 6/16/2025       To Whom It May Concern:    Derick Finnegan was seen in my clinic on 6/16/2025 at 4:30 pm. Please excuse Derick for her absence from work on this day to make the appointment.    If you have any questions or concerns, please don't hesitate to call.         Sincerely,         [facility]        CC: No Recipients

## 2025-06-16 NOTE — PROGRESS NOTES
"Subjective   Patient ID: Derick Finnegan is a 19 y.o. female. They present today with a chief complaint of Other (HS flare up/Needs new scrip for Clindamycin/And work note ).    Patient disposition: Home    History of Present Illness  HPI  Patient here for refill on antibiotics for hidradenitis suppurativa flareup.  Patient usually gets a flare provide her menstrual cycle, which she is going through now.  He usually applies a warm compress to the area, and is able to express some pus when the lesions get bigger.  Symptoms mostly on the right side, mild on the left.  No fever or chills.  Has follow-up with dermatology, but not until 3 months from now.  Use responds well to clindamycin topical ointment.  No other complaints.  Requesting work note    Past Medical History  Allergies as of 06/16/2025    (No Known Allergies)       Prescriptions Prior to Admission[1]     Current Medications[2]    Problem List[3]    Surgical History[4]     reports that she has never smoked. She has never been exposed to tobacco smoke. She has never used smokeless tobacco. She reports current alcohol use. She reports that she does not currently use drugs after having used the following drugs: Marijuana.    Review of Systems  As noted in HPI. ROS otherwise negative unless noted.       Objective    Vitals:    06/16/25 1633   BP: 114/55   Pulse: 89   Resp: 19   Temp: 36.7 °C (98 °F)   SpO2: 100%   Weight: 54.4 kg (120 lb)   Height: 1.575 m (5' 2\")     Patient's last menstrual period was 06/14/2025.    Physical Exam  Constitutional: vital signs reviewed. Well developed, well nourished. patient alert and patient without distress.   Psych: Normal mood and affect  Skin: Normal skin color and pigmentation, normal skin turgor, and no rash.  Right axilla with about 5 small pimple-like lesions.  No significant fluctuance or drainage noted.  No scabbing.  No surrounding lymphadenopathy.  Lymphatic: No extremity edema  Cardiovascular: No edema in the " extremities. Normal skin color/perfusion.   Pulmonary: Skin without cyanosis. Patient without respiratory distress. Speaking in full sentences.  Musculoskeletal: Normal gait and stance, balance and coordination without gross deficit.        Procedures    Point of Care Test & Imaging Results from this visit           Diagnostic study results (if any) were reviewed.  (If applicable) preliminary radiology reading: None    Assessment/Plan   Allergies, medications, history, and pertinent labs/EKGs/Imaging reviewed.        Medical Decision Making  See note    Orders and Diagnoses  There are no diagnoses linked to this encounter.    Medical Admin Record      Follow Up Instructions  No follow-ups on file.    At time of discharge patient was clinically well-appearing and HDS for outpatient management. The patient and/or family was educated regarding diagnosis, supportive care, OTC and Rx medications. The patient and/or family was given the opportunity to ask questions prior to discharge and all questions answered. They verbalized understanding of my discussion of the plans for treatment, expected course, indications to return to  or seek further evaluation in ED, and the need for timely follow up as directed.      [facility]           [1] (Not in a hospital admission)  [2]   Current Outpatient Medications   Medication Sig Dispense Refill    clindamycin phosphate (Clindagel) 1 % gel, once daily Apply 1 g topically 2 times a day. 20 mL 0    levocetirizine dihydrochloride (XYZAL ORAL) Xyzal TABS   Refills: 0       Active      lidocaine (Lidoderm) 5 % patch Place 1 patch over 12 hours on the skin once daily. Remove & discard patch within 12 hours or as directed by MD. 10 patch 0    lidocaine HCL (Aspercreme, lidocaine HCL,) 4 % cream APPLY 2 INCHES 3 times daily      norethindrone-e.estradioL-iron (Junel Fe 24) 1 mg-20 mcg (24)/75 mg (4) tablet Take 1 tablet by mouth once daily. 84 tablet 1    orphenadrine (Norflex)  100 mg 12 hr tablet Take 1 tablet (100 mg) by mouth as needed at bedtime for muscle spasms for up to 7 days. Do not crush, chew, or split. 10 tablet 0    spironolactone (Aldactone) 50 mg tablet Take 1 tablet (50 mg) by mouth once daily. 90 tablet 1    ZINC ORAL        No current facility-administered medications for this visit.   [3]   Patient Active Problem List  Diagnosis    Allergic rhinitis    Dysmenorrhea    Muscle spasm    Cellulitis of female breast   [4]   Past Surgical History:  Procedure Laterality Date    OTHER SURGICAL HISTORY  01/04/2023    Throat surgery

## 2025-07-16 ENCOUNTER — OFFICE VISIT (OUTPATIENT)
Dept: URGENT CARE | Age: 20
End: 2025-07-16
Payer: COMMERCIAL

## 2025-07-16 VITALS
DIASTOLIC BLOOD PRESSURE: 69 MMHG | HEIGHT: 62 IN | SYSTOLIC BLOOD PRESSURE: 105 MMHG | WEIGHT: 120 LBS | HEART RATE: 68 BPM | BODY MASS INDEX: 22.08 KG/M2 | RESPIRATION RATE: 14 BRPM | TEMPERATURE: 98.2 F | OXYGEN SATURATION: 100 %

## 2025-07-16 DIAGNOSIS — J39.8 TRACHEAL MASS: Primary | ICD-10-CM

## 2025-07-16 RX ORDER — CEPHALEXIN 500 MG/1
500 CAPSULE ORAL 2 TIMES DAILY
Qty: 20 CAPSULE | Refills: 0 | Status: SHIPPED | OUTPATIENT
Start: 2025-07-16 | End: 2025-07-26

## 2025-07-16 ASSESSMENT — PAIN SCALES - GENERAL: PAINLEVEL_OUTOF10: 6

## 2025-07-16 NOTE — PATIENT INSTRUCTIONS
You will be started on antibiotic which will be sent to the pharmacy; please complete the regimen as directed even if your symptoms improve. It is recommended to take an Probiotic while on this medication to reduce symptoms of upset stomach, diarrhea    Follow-up with ENT or PCP within the next 2 to 3 days for follow-up testing and workup.  If you exhibit any shortness of breath, trouble swallowing or significant pain, go to the emergency room.    You may use over-the-counter anti-inflammatory such as ibuprofen, Advil, Aleve for pain. For inflammation, ibuprofen doses include 400-600 mg 2-3 times per day. Tylenol is acceptable to use for pain.

## 2025-07-16 NOTE — PROGRESS NOTES
"Subjective   Patient ID: Derick Finnegan is a 19 y.o. female. They present today with a chief complaint of Sore Throat (For 2 days).    Patient disposition: Home    History of Present Illness  HPI  Sore throat and fullness sensation in right supraclavicular throat for the past 2 days.  Had something similar about 2 years ago and was more significant, went to the ER and was found to have an abscess on her trachea on the left side that required emergent surgery.  Denies any trouble swallowing.  Discomfort when doing so however.  No fever or chills.  No mucus production or drooling.  Movements of neck causing discomfort.      Past Medical History  Allergies as of 07/16/2025    (No Known Allergies)       Prescriptions Prior to Admission[1]     Current Medications[2]    Problem List[3]    Surgical History[4]     reports that she has never smoked. She has never been exposed to tobacco smoke. She has never used smokeless tobacco. She reports current alcohol use. She reports that she does not currently use drugs after having used the following drugs: Marijuana.    Review of Systems  As noted in HPI. ROS otherwise negative unless noted.       Objective    Vitals:    07/16/25 1250   BP: 105/69   BP Location: Left arm   Patient Position: Sitting   BP Cuff Size: Adult   Pulse: 68   Resp: 14   Temp: 36.8 °C (98.2 °F)   TempSrc: Oral   SpO2: 100%   Weight: 54.4 kg (120 lb)   Height: 1.575 m (5' 2\")     Patient's last menstrual period was 07/11/2025 (exact date).    Physical Exam  Constitutional: vital signs reviewed. Well developed, well nourished. patient alert and patient without distress.   Psych: Normal mood and affect  Head and Face: Normal and atraumatic.    Cardiovascular: Heart rate normal, normal S1 and S2, no gallops, no murmurs and no pericardial rub. Rhythm: Normal.  Pulmonary: No respiratory distress. Palpation of chest: Normal. Clear bilateral breath sounds.   Skin: Normal skin color and pigmentation, normal skin " turgor, and no rash.  ENT: Normal posterior pharynx.  Normal oral mucosa.  Normal external lips.  No cervical lymphadenopathy.  Right super clavicular about 1 cm there is a subcentimeter cystic structure that is tender.  No overlying skin changes.  Patient speaking in full sentences and in no apparent discomfort or distress.        Procedures    Point of Care Test & Imaging Results from this visit           Diagnostic study results (if any) were reviewed.  (If applicable) preliminary radiology reading: None    Assessment/Plan   Allergies, medications, history, and pertinent labs/EKGs/Imaging reviewed.        Medical Decision Making  See note    Orders and Diagnoses  There are no diagnoses linked to this encounter.    Medical Admin Record      Follow Up Instructions  No follow-ups on file.    At time of discharge patient was clinically well-appearing and HDS for outpatient management. The patient and/or family was educated regarding diagnosis, supportive care, OTC and Rx medications. The patient and/or family was given the opportunity to ask questions prior to discharge and all questions answered. They verbalized understanding of my discussion of the plans for treatment, expected course, indications to return to  or seek further evaluation in ED, and the need for timely follow up as directed.      [facility]           [1] (Not in a hospital admission)  [2]   Current Outpatient Medications   Medication Sig Dispense Refill    clindamycin phosphate (Clindagel) 1 % gel, once daily Apply 1 g topically 2 times a day. 20 mL 0    ZINC ORAL       levocetirizine dihydrochloride (XYZAL ORAL) Xyzal TABS   Refills: 0       Active (Patient not taking: Reported on 7/16/2025)      lidocaine (Lidoderm) 5 % patch Place 1 patch over 12 hours on the skin once daily. Remove & discard patch within 12 hours or as directed by MD. (Patient not taking: Reported on 7/16/2025) 10 patch 0    lidocaine HCL (Aspercreme, lidocaine HCL,)  4 % cream APPLY 2 INCHES 3 times daily (Patient not taking: Reported on 7/16/2025)      norethindrone-e.estradioL-iron (Junel Fe 24) 1 mg-20 mcg (24)/75 mg (4) tablet Take 1 tablet by mouth once daily. 84 tablet 1    orphenadrine (Norflex) 100 mg 12 hr tablet Take 1 tablet (100 mg) by mouth as needed at bedtime for muscle spasms for up to 7 days. Do not crush, chew, or split. 10 tablet 0    spironolactone (Aldactone) 50 mg tablet Take 1 tablet (50 mg) by mouth once daily. 90 tablet 1     No current facility-administered medications for this visit.   [3]   Patient Active Problem List  Diagnosis    Allergic rhinitis    Dysmenorrhea    Muscle spasm    Cellulitis of female breast   [4]   Past Surgical History:  Procedure Laterality Date    OTHER SURGICAL HISTORY  01/04/2023    Throat surgery

## 2025-07-16 NOTE — LETTER
July 16, 2025     Patient: Derick Finnegan   YOB: 2005   Date of Visit: 7/16/2025       To Whom It May Concern:    It is my medical opinion that Derick Finnegan may return to work on 07/16/2025.    If you have any questions or concerns, please don't hesitate to call.         Sincerely,        [place],     CC: No Recipients

## 2025-09-03 ENCOUNTER — OFFICE VISIT (OUTPATIENT)
Dept: URGENT CARE | Age: 20
End: 2025-09-03
Payer: COMMERCIAL

## 2025-09-03 VITALS
HEART RATE: 93 BPM | DIASTOLIC BLOOD PRESSURE: 65 MMHG | WEIGHT: 120 LBS | SYSTOLIC BLOOD PRESSURE: 112 MMHG | HEIGHT: 62 IN | BODY MASS INDEX: 22.08 KG/M2 | RESPIRATION RATE: 16 BRPM | TEMPERATURE: 99 F | OXYGEN SATURATION: 100 %

## 2025-09-03 DIAGNOSIS — N94.6 DYSMENORRHEA: ICD-10-CM

## 2025-09-03 DIAGNOSIS — N30.00 ACUTE CYSTITIS WITHOUT HEMATURIA: Primary | ICD-10-CM

## 2025-09-03 DIAGNOSIS — R30.0 DYSURIA: ICD-10-CM

## 2025-09-03 LAB
POC APPEARANCE, URINE: CLEAR
POC BILIRUBIN, URINE: NEGATIVE
POC BLOOD, URINE: NEGATIVE
POC COLOR, URINE: YELLOW
POC GLUCOSE, URINE: NEGATIVE MG/DL
POC KETONES, URINE: NEGATIVE MG/DL
POC LEUKOCYTES, URINE: NEGATIVE
POC NITRITE,URINE: NEGATIVE
POC PH, URINE: 6.5 PH
POC PROTEIN, URINE: NEGATIVE MG/DL
POC SPECIFIC GRAVITY, URINE: 1.01
POC UROBILINOGEN, URINE: 0.2 EU/DL
PREGNANCY TEST URINE, POC: NEGATIVE

## 2025-09-03 RX ORDER — MELOXICAM 15 MG/1
15 TABLET ORAL DAILY
Qty: 5 TABLET | Refills: 1 | Status: SHIPPED | OUTPATIENT
Start: 2025-09-03 | End: 2025-09-04

## 2025-09-03 RX ORDER — SULFAMETHOXAZOLE AND TRIMETHOPRIM 800; 160 MG/1; MG/1
1 TABLET ORAL 2 TIMES DAILY
Qty: 14 TABLET | Refills: 0 | Status: SHIPPED | OUTPATIENT
Start: 2025-09-03 | End: 2025-09-04

## 2025-09-03 RX ORDER — ONDANSETRON 8 MG/1
8 TABLET, ORALLY DISINTEGRATING ORAL EVERY 8 HOURS PRN
Qty: 20 TABLET | Refills: 1 | Status: SHIPPED | OUTPATIENT
Start: 2025-09-03 | End: 2025-09-04

## 2025-09-03 ASSESSMENT — PAIN SCALES - GENERAL: PAINLEVEL_OUTOF10: 6

## 2025-09-03 ASSESSMENT — ENCOUNTER SYMPTOMS: ABDOMINAL PAIN: 1

## 2025-09-04 RX ORDER — ONDANSETRON 8 MG/1
8 TABLET, ORALLY DISINTEGRATING ORAL EVERY 8 HOURS PRN
Qty: 20 TABLET | Refills: 0 | Status: SHIPPED | OUTPATIENT
Start: 2025-09-04 | End: 2025-09-11

## 2025-09-04 RX ORDER — MELOXICAM 15 MG/1
15 TABLET ORAL DAILY
Qty: 5 TABLET | Refills: 0 | Status: SHIPPED | OUTPATIENT
Start: 2025-09-04 | End: 2025-10-04

## 2025-09-04 RX ORDER — SULFAMETHOXAZOLE AND TRIMETHOPRIM 800; 160 MG/1; MG/1
1 TABLET ORAL 2 TIMES DAILY
Qty: 14 TABLET | Refills: 0 | Status: SHIPPED | OUTPATIENT
Start: 2025-09-04 | End: 2025-09-11

## 2025-09-05 LAB — BACTERIA UR CULT: NORMAL

## 2025-09-30 ENCOUNTER — APPOINTMENT (OUTPATIENT)
Dept: DERMATOLOGY | Facility: CLINIC | Age: 20
End: 2025-09-30
Payer: COMMERCIAL